# Patient Record
Sex: FEMALE | Race: WHITE | NOT HISPANIC OR LATINO | URBAN - METROPOLITAN AREA
[De-identification: names, ages, dates, MRNs, and addresses within clinical notes are randomized per-mention and may not be internally consistent; named-entity substitution may affect disease eponyms.]

---

## 2018-06-01 ENCOUNTER — OUTPATIENT (OUTPATIENT)
Dept: OUTPATIENT SERVICES | Facility: HOSPITAL | Age: 58
LOS: 1 days | Discharge: HOME | End: 2018-06-01

## 2018-06-01 VITALS
HEIGHT: 64 IN | SYSTOLIC BLOOD PRESSURE: 110 MMHG | DIASTOLIC BLOOD PRESSURE: 69 MMHG | OXYGEN SATURATION: 97 % | TEMPERATURE: 98 F | RESPIRATION RATE: 17 BRPM | HEART RATE: 69 BPM | WEIGHT: 202.83 LBS

## 2018-06-01 DIAGNOSIS — J33.9 NASAL POLYP, UNSPECIFIED: Chronic | ICD-10-CM

## 2018-06-01 DIAGNOSIS — Z01.818 ENCOUNTER FOR OTHER PREPROCEDURAL EXAMINATION: ICD-10-CM

## 2018-06-01 DIAGNOSIS — K80.20 CALCULUS OF GALLBLADDER WITHOUT CHOLECYSTITIS WITHOUT OBSTRUCTION: ICD-10-CM

## 2018-06-01 LAB
ALBUMIN SERPL ELPH-MCNC: 4 G/DL — SIGNIFICANT CHANGE UP (ref 3.5–5.2)
ALBUMIN SERPL ELPH-MCNC: 4.8 G/DL — SIGNIFICANT CHANGE UP (ref 3.5–5.2)
ALP SERPL-CCNC: 57 U/L — SIGNIFICANT CHANGE UP (ref 30–115)
ALP SERPL-CCNC: 86 U/L — SIGNIFICANT CHANGE UP (ref 30–115)
ALT FLD-CCNC: 17 U/L — SIGNIFICANT CHANGE UP (ref 0–41)
ALT FLD-CCNC: 73 U/L — HIGH (ref 0–41)
ANION GAP SERPL CALC-SCNC: 13 MMOL/L — SIGNIFICANT CHANGE UP (ref 7–14)
ANION GAP SERPL CALC-SCNC: 20 MMOL/L — HIGH (ref 7–14)
AST SERPL-CCNC: 23 U/L — SIGNIFICANT CHANGE UP (ref 0–41)
AST SERPL-CCNC: 45 U/L — HIGH (ref 0–41)
BASOPHILS # BLD AUTO: 0.04 K/UL — SIGNIFICANT CHANGE UP (ref 0–0.2)
BASOPHILS NFR BLD AUTO: 0.5 % — SIGNIFICANT CHANGE UP (ref 0–1)
BILIRUB SERPL-MCNC: 0.2 MG/DL — SIGNIFICANT CHANGE UP (ref 0.2–1.2)
BILIRUB SERPL-MCNC: 0.3 MG/DL — SIGNIFICANT CHANGE UP (ref 0.2–1.2)
BUN SERPL-MCNC: 13 MG/DL — SIGNIFICANT CHANGE UP (ref 10–20)
BUN SERPL-MCNC: 20 MG/DL — SIGNIFICANT CHANGE UP (ref 10–20)
CALCIUM SERPL-MCNC: 8.6 MG/DL — SIGNIFICANT CHANGE UP (ref 8.5–10.1)
CALCIUM SERPL-MCNC: 9.7 MG/DL — SIGNIFICANT CHANGE UP (ref 8.5–10.1)
CHLORIDE SERPL-SCNC: 103 MMOL/L — SIGNIFICANT CHANGE UP (ref 98–110)
CHLORIDE SERPL-SCNC: 99 MMOL/L — SIGNIFICANT CHANGE UP (ref 98–110)
CO2 SERPL-SCNC: 21 MMOL/L — SIGNIFICANT CHANGE UP (ref 17–32)
CO2 SERPL-SCNC: 27 MMOL/L — SIGNIFICANT CHANGE UP (ref 17–32)
CREAT SERPL-MCNC: 0.8 MG/DL — SIGNIFICANT CHANGE UP (ref 0.7–1.5)
CREAT SERPL-MCNC: 1.2 MG/DL — SIGNIFICANT CHANGE UP (ref 0.7–1.5)
EOSINOPHIL # BLD AUTO: 4.35 K/UL — HIGH (ref 0–0.7)
EOSINOPHIL NFR BLD AUTO: 49.8 % — HIGH (ref 0–8)
GLUCOSE SERPL-MCNC: 79 MG/DL — SIGNIFICANT CHANGE UP (ref 70–99)
GLUCOSE SERPL-MCNC: 90 MG/DL — SIGNIFICANT CHANGE UP (ref 70–99)
HCT VFR BLD CALC: 40.4 % — SIGNIFICANT CHANGE UP (ref 37–47)
HGB BLD-MCNC: 13.3 G/DL — SIGNIFICANT CHANGE UP (ref 12–16)
IMM GRANULOCYTES NFR BLD AUTO: 0.2 % — SIGNIFICANT CHANGE UP (ref 0.1–0.3)
LYMPHOCYTES # BLD AUTO: 2.03 K/UL — SIGNIFICANT CHANGE UP (ref 1.2–3.4)
LYMPHOCYTES # BLD AUTO: 23.3 % — SIGNIFICANT CHANGE UP (ref 20.5–51.1)
MCHC RBC-ENTMCNC: 28.7 PG — SIGNIFICANT CHANGE UP (ref 27–31)
MCHC RBC-ENTMCNC: 32.9 G/DL — SIGNIFICANT CHANGE UP (ref 32–37)
MCV RBC AUTO: 87.3 FL — SIGNIFICANT CHANGE UP (ref 81–99)
MONOCYTES # BLD AUTO: 0.43 K/UL — SIGNIFICANT CHANGE UP (ref 0.1–0.6)
MONOCYTES NFR BLD AUTO: 4.9 % — SIGNIFICANT CHANGE UP (ref 1.7–9.3)
NEUTROPHILS # BLD AUTO: 1.86 K/UL — SIGNIFICANT CHANGE UP (ref 1.4–6.5)
NEUTROPHILS NFR BLD AUTO: 21.3 % — LOW (ref 42.2–75.2)
NRBC # BLD: 0 /100 WBCS — SIGNIFICANT CHANGE UP (ref 0–0)
PLATELET # BLD AUTO: 229 K/UL — SIGNIFICANT CHANGE UP (ref 130–400)
POTASSIUM SERPL-MCNC: 4.2 MMOL/L — SIGNIFICANT CHANGE UP (ref 3.5–5)
POTASSIUM SERPL-MCNC: 4.6 MMOL/L — SIGNIFICANT CHANGE UP (ref 3.5–5)
POTASSIUM SERPL-SCNC: 4.2 MMOL/L — SIGNIFICANT CHANGE UP (ref 3.5–5)
POTASSIUM SERPL-SCNC: 4.6 MMOL/L — SIGNIFICANT CHANGE UP (ref 3.5–5)
PROT SERPL-MCNC: 6.2 G/DL — SIGNIFICANT CHANGE UP (ref 6–8)
PROT SERPL-MCNC: 7.2 G/DL — SIGNIFICANT CHANGE UP (ref 6–8)
RBC # BLD: 4.63 M/UL — SIGNIFICANT CHANGE UP (ref 4.2–5.4)
RBC # FLD: 12.9 % — SIGNIFICANT CHANGE UP (ref 11.5–14.5)
SODIUM SERPL-SCNC: 139 MMOL/L — SIGNIFICANT CHANGE UP (ref 135–146)
SODIUM SERPL-SCNC: 144 MMOL/L — SIGNIFICANT CHANGE UP (ref 135–146)
WBC # BLD: 8.73 K/UL — SIGNIFICANT CHANGE UP (ref 4.8–10.8)
WBC # FLD AUTO: 8.73 K/UL — SIGNIFICANT CHANGE UP (ref 4.8–10.8)

## 2018-06-01 NOTE — H&P PST ADULT - FAMILY HISTORY
Father  Still living? No  CVA (cerebral vascular accident), Age at diagnosis: Age Unknown     Mother  Still living? Yes, Estimated age: Age Unknown  Family history of breast cancer, Age at diagnosis: Age Unknown

## 2018-06-01 NOTE — H&P PST ADULT - NSANTHOSAYNRD_GEN_A_CORE
No. NARDA screening performed.  STOP BANG Legend: 0-2 = LOW Risk; 3-4 = INTERMEDIATE Risk; 5-8 = HIGH Risk

## 2018-06-01 NOTE — H&P PST ADULT - PMH
Asthma  LAST ATTACK 2/18, WAS HOSPITALIZED  COPD (chronic obstructive pulmonary disease)    Dyspnea  W/ EXERTION Asthma  LAST ATTACK 2/18, WAS HOSPITALIZED  COPD (chronic obstructive pulmonary disease)    Dyspnea  W/ EXERTION  NARDA (obstructive sleep apnea)  DENIES CPAP OR BIPAP (SEE PULMONARY EVAL FOR POST-OP RECOMMENDATIONS)

## 2018-06-01 NOTE — H&P PST ADULT - REASON FOR ADMISSION
57 Y/O FEMALE HERE FOR PRE-ADMISSION SURGICAL TESTING. PATIENT REPORTS HAD EPIGASTRIC PAIN. W/U REVEALED + GALLSTONES.  NOW FOR SCHEDULED PROCEDURE.

## 2018-06-06 ENCOUNTER — RESULT REVIEW (OUTPATIENT)
Age: 58
End: 2018-06-06

## 2018-06-06 ENCOUNTER — OUTPATIENT (OUTPATIENT)
Dept: OUTPATIENT SERVICES | Facility: HOSPITAL | Age: 58
LOS: 1 days | Discharge: HOME | End: 2018-06-06

## 2018-06-06 VITALS — HEART RATE: 66 BPM | SYSTOLIC BLOOD PRESSURE: 116 MMHG | DIASTOLIC BLOOD PRESSURE: 69 MMHG | RESPIRATION RATE: 18 BRPM

## 2018-06-06 VITALS
TEMPERATURE: 99 F | SYSTOLIC BLOOD PRESSURE: 144 MMHG | WEIGHT: 199.96 LBS | HEIGHT: 64 IN | HEART RATE: 76 BPM | DIASTOLIC BLOOD PRESSURE: 75 MMHG | RESPIRATION RATE: 18 BRPM

## 2018-06-06 DIAGNOSIS — J33.9 NASAL POLYP, UNSPECIFIED: Chronic | ICD-10-CM

## 2018-06-06 RX ORDER — OXYCODONE AND ACETAMINOPHEN 5; 325 MG/1; MG/1
1 TABLET ORAL EVERY 4 HOURS
Qty: 0 | Refills: 0 | Status: DISCONTINUED | OUTPATIENT
Start: 2018-06-06 | End: 2018-06-06

## 2018-06-06 RX ORDER — MEPERIDINE HYDROCHLORIDE 50 MG/ML
12.5 INJECTION INTRAMUSCULAR; INTRAVENOUS; SUBCUTANEOUS
Qty: 0 | Refills: 0 | Status: DISCONTINUED | OUTPATIENT
Start: 2018-06-06 | End: 2018-06-06

## 2018-06-06 RX ORDER — OXYCODONE AND ACETAMINOPHEN 5; 325 MG/1; MG/1
2 TABLET ORAL EVERY 6 HOURS
Qty: 0 | Refills: 0 | Status: DISCONTINUED | OUTPATIENT
Start: 2018-06-06 | End: 2018-06-06

## 2018-06-06 RX ORDER — FLUTICASONE FUROATE AND VILANTEROL TRIFENATATE 100; 25 UG/1; UG/1
1 POWDER RESPIRATORY (INHALATION)
Qty: 0 | Refills: 0 | COMMUNITY

## 2018-06-06 RX ORDER — MORPHINE SULFATE 50 MG/1
2 CAPSULE, EXTENDED RELEASE ORAL
Qty: 0 | Refills: 0 | Status: DISCONTINUED | OUTPATIENT
Start: 2018-06-06 | End: 2018-06-06

## 2018-06-06 RX ORDER — MORPHINE SULFATE 50 MG/1
4 CAPSULE, EXTENDED RELEASE ORAL
Qty: 0 | Refills: 0 | Status: DISCONTINUED | OUTPATIENT
Start: 2018-06-06 | End: 2018-06-06

## 2018-06-06 RX ORDER — MONTELUKAST 4 MG/1
1 TABLET, CHEWABLE ORAL
Qty: 0 | Refills: 0 | COMMUNITY

## 2018-06-06 RX ORDER — ONDANSETRON 8 MG/1
4 TABLET, FILM COATED ORAL ONCE
Qty: 0 | Refills: 0 | Status: DISCONTINUED | OUTPATIENT
Start: 2018-06-06 | End: 2018-06-06

## 2018-06-06 RX ORDER — SODIUM CHLORIDE 9 MG/ML
1000 INJECTION, SOLUTION INTRAVENOUS
Qty: 0 | Refills: 0 | Status: DISCONTINUED | OUTPATIENT
Start: 2018-06-06 | End: 2018-06-06

## 2018-06-06 RX ADMIN — SODIUM CHLORIDE 100 MILLILITER(S): 9 INJECTION, SOLUTION INTRAVENOUS at 13:47

## 2018-06-06 RX ADMIN — OXYCODONE AND ACETAMINOPHEN 1 TABLET(S): 5; 325 TABLET ORAL at 14:26

## 2018-06-06 RX ADMIN — OXYCODONE AND ACETAMINOPHEN 1 TABLET(S): 5; 325 TABLET ORAL at 14:25

## 2018-06-06 NOTE — BRIEF OPERATIVE NOTE - PROCEDURE
<<-----Click on this checkbox to enter Procedure Laparoscopic cholecystectomy  06/06/2018    Active  HEVER

## 2018-06-06 NOTE — PRE-ANESTHESIA EVALUATION ADULT - NSANTHADDINFOFT_GEN_ALL_CORE
General. discussed with the patient all the risks, benefits, alternatives, complications. all questions answered. willing to proceed

## 2018-06-06 NOTE — ASU PATIENT PROFILE, ADULT - PMH
Asthma  LAST ATTACK 2/18, WAS HOSPITALIZED  COPD (chronic obstructive pulmonary disease)    Dyspnea  W/ EXERTION  NARDA (obstructive sleep apnea)  DENIES CPAP OR BIPAP (SEE PULMONARY EVAL FOR POST-OP RECOMMENDATIONS)

## 2018-06-06 NOTE — CHART NOTE - NSCHARTNOTEFT_GEN_A_CORE
PACU ANESTHESIA ADMISSION NOTE      Procedure: Laparoscopic cholecystectomy    Post op diagnosis:  Chronic cholecystitis      ____  Intubated  TV:______       Rate: ______      FiO2: ______    _x___  Patent Airway    _x___  Full return of protective reflexes    _x___  Full recovery from anesthesia / back to baseline status    Vitals:            T:      97.7          BP :    134/74            R:  14            Sat:   98%            P:74      Mental Status:  _x___ Awake   _____ Alert   _____ Drowsy   _____ Sedated    Nausea/Vomiting:  _x___  NO       ______Yes,   See Post - Op Orders         Pain Scale (0-10):  __0___    Treatment: _x___ None    ____ See Post - Op/PCA Orders    Post - Operative Fluids:   __x__ Oral   ____ See Post - Op Orders    Plan: Discharge:   _x___Home       _____Floor     _____Critical Care    _____  Other:_________________    Comments:  No anesthesia issues or complications noted.  Discharge when criteria met.

## 2018-06-08 LAB — SURGICAL PATHOLOGY STUDY: SIGNIFICANT CHANGE UP

## 2018-06-11 DIAGNOSIS — Z88.8 ALLERGY STATUS TO OTHER DRUGS, MEDICAMENTS AND BIOLOGICAL SUBSTANCES STATUS: ICD-10-CM

## 2018-06-11 DIAGNOSIS — K80.10 CALCULUS OF GALLBLADDER WITH CHRONIC CHOLECYSTITIS WITHOUT OBSTRUCTION: ICD-10-CM

## 2018-06-11 DIAGNOSIS — G47.33 OBSTRUCTIVE SLEEP APNEA (ADULT) (PEDIATRIC): ICD-10-CM

## 2018-06-11 DIAGNOSIS — J44.9 CHRONIC OBSTRUCTIVE PULMONARY DISEASE, UNSPECIFIED: ICD-10-CM

## 2018-12-25 ENCOUNTER — INPATIENT (INPATIENT)
Facility: HOSPITAL | Age: 58
LOS: 4 days | Discharge: HOME | End: 2018-12-30
Attending: INTERNAL MEDICINE | Admitting: INTERNAL MEDICINE

## 2018-12-25 VITALS — WEIGHT: 199.96 LBS

## 2018-12-25 DIAGNOSIS — J33.9 NASAL POLYP, UNSPECIFIED: Chronic | ICD-10-CM

## 2018-12-25 LAB
ALBUMIN SERPL ELPH-MCNC: 4.2 G/DL — SIGNIFICANT CHANGE UP (ref 3.5–5.2)
ALP SERPL-CCNC: 188 U/L — HIGH (ref 30–115)
ALT FLD-CCNC: 58 U/L — HIGH (ref 0–41)
ANION GAP SERPL CALC-SCNC: 15 MMOL/L — HIGH (ref 7–14)
APPEARANCE UR: CLEAR — SIGNIFICANT CHANGE UP
AST SERPL-CCNC: 122 U/L — HIGH (ref 0–41)
BACTERIA # UR AUTO: ABNORMAL
BASOPHILS # BLD AUTO: 0.11 K/UL — SIGNIFICANT CHANGE UP (ref 0–0.2)
BASOPHILS NFR BLD AUTO: 0.6 % — SIGNIFICANT CHANGE UP (ref 0–1)
BILIRUB SERPL-MCNC: 0.3 MG/DL — SIGNIFICANT CHANGE UP (ref 0.2–1.2)
BILIRUB UR-MCNC: NEGATIVE — SIGNIFICANT CHANGE UP
BUN SERPL-MCNC: 19 MG/DL — SIGNIFICANT CHANGE UP (ref 10–20)
CALCIUM SERPL-MCNC: 8.8 MG/DL — SIGNIFICANT CHANGE UP (ref 8.5–10.1)
CHLORIDE SERPL-SCNC: 100 MMOL/L — SIGNIFICANT CHANGE UP (ref 98–110)
CK MB CFR SERPL CALC: 5.6 NG/ML — SIGNIFICANT CHANGE UP (ref 0.6–6.3)
CK SERPL-CCNC: 170 U/L — SIGNIFICANT CHANGE UP (ref 0–225)
CO2 SERPL-SCNC: 25 MMOL/L — SIGNIFICANT CHANGE UP (ref 17–32)
COLOR SPEC: YELLOW — SIGNIFICANT CHANGE UP
COMMENT - URINE: SIGNIFICANT CHANGE UP
CREAT SERPL-MCNC: 1.1 MG/DL — SIGNIFICANT CHANGE UP (ref 0.7–1.5)
DIFF PNL FLD: ABNORMAL
EOSINOPHIL # BLD AUTO: 0.36 K/UL — SIGNIFICANT CHANGE UP (ref 0–0.7)
EOSINOPHIL NFR BLD AUTO: 2.1 % — SIGNIFICANT CHANGE UP (ref 0–8)
EPI CELLS # UR: ABNORMAL /HPF
ETHANOL SERPL-MCNC: <10 MG/DL — HIGH
GAS PNL BLDA: SIGNIFICANT CHANGE UP
GLUCOSE SERPL-MCNC: 272 MG/DL — HIGH (ref 70–99)
GLUCOSE UR QL: >=1000 MG/DL
HCT VFR BLD CALC: 43.4 % — SIGNIFICANT CHANGE UP (ref 37–47)
HGB BLD-MCNC: 13.7 G/DL — SIGNIFICANT CHANGE UP (ref 12–16)
IMM GRANULOCYTES NFR BLD AUTO: 2.3 % — HIGH (ref 0.1–0.3)
KETONES UR-MCNC: NEGATIVE — SIGNIFICANT CHANGE UP
LEUKOCYTE ESTERASE UR-ACNC: NEGATIVE — SIGNIFICANT CHANGE UP
LIDOCAIN IGE QN: 33 U/L — SIGNIFICANT CHANGE UP (ref 7–60)
LYMPHOCYTES # BLD AUTO: 47 % — SIGNIFICANT CHANGE UP (ref 20.5–51.1)
LYMPHOCYTES # BLD AUTO: 7.97 K/UL — HIGH (ref 1.2–3.4)
MCHC RBC-ENTMCNC: 28.4 PG — SIGNIFICANT CHANGE UP (ref 27–31)
MCHC RBC-ENTMCNC: 31.6 G/DL — LOW (ref 32–37)
MCV RBC AUTO: 90 FL — SIGNIFICANT CHANGE UP (ref 81–99)
MONOCYTES # BLD AUTO: 0.87 K/UL — HIGH (ref 0.1–0.6)
MONOCYTES NFR BLD AUTO: 5.1 % — SIGNIFICANT CHANGE UP (ref 1.7–9.3)
NEUTROPHILS # BLD AUTO: 7.25 K/UL — HIGH (ref 1.4–6.5)
NEUTROPHILS NFR BLD AUTO: 42.9 % — SIGNIFICANT CHANGE UP (ref 42.2–75.2)
NITRITE UR-MCNC: NEGATIVE — SIGNIFICANT CHANGE UP
NRBC # BLD: 0 /100 WBCS — SIGNIFICANT CHANGE UP (ref 0–0)
PH UR: 5 — SIGNIFICANT CHANGE UP (ref 5–8)
PLATELET # BLD AUTO: 327 K/UL — SIGNIFICANT CHANGE UP (ref 130–400)
POTASSIUM SERPL-MCNC: 4.6 MMOL/L — SIGNIFICANT CHANGE UP (ref 3.5–5)
POTASSIUM SERPL-SCNC: 4.6 MMOL/L — SIGNIFICANT CHANGE UP (ref 3.5–5)
PROT SERPL-MCNC: 6.7 G/DL — SIGNIFICANT CHANGE UP (ref 6–8)
PROT UR-MCNC: 100 MG/DL
RBC # BLD: 4.82 M/UL — SIGNIFICANT CHANGE UP (ref 4.2–5.4)
RBC # FLD: 13.2 % — SIGNIFICANT CHANGE UP (ref 11.5–14.5)
RBC CASTS # UR COMP ASSIST: SIGNIFICANT CHANGE UP /HPF
SODIUM SERPL-SCNC: 140 MMOL/L — SIGNIFICANT CHANGE UP (ref 135–146)
SP GR SPEC: >=1.03 (ref 1.01–1.03)
TROPONIN T SERPL-MCNC: 0.15 NG/ML — CRITICAL HIGH
TROPONIN T SERPL-MCNC: <0.01 NG/ML — SIGNIFICANT CHANGE UP
UROBILINOGEN FLD QL: 0.2 MG/DL — SIGNIFICANT CHANGE UP (ref 0.2–0.2)
WBC # BLD: 16.95 K/UL — HIGH (ref 4.8–10.8)
WBC # FLD AUTO: 16.95 K/UL — HIGH (ref 4.8–10.8)
WBC UR QL: ABNORMAL /HPF

## 2018-12-25 RX ORDER — DIPHENHYDRAMINE HCL 50 MG
50 CAPSULE ORAL ONCE
Qty: 0 | Refills: 0 | Status: COMPLETED | OUTPATIENT
Start: 2018-12-25 | End: 2018-12-25

## 2018-12-25 RX ORDER — ENOXAPARIN SODIUM 100 MG/ML
40 INJECTION SUBCUTANEOUS DAILY
Qty: 0 | Refills: 0 | Status: DISCONTINUED | OUTPATIENT
Start: 2018-12-25 | End: 2018-12-30

## 2018-12-25 RX ORDER — IPRATROPIUM/ALBUTEROL SULFATE 18-103MCG
1 AEROSOL WITH ADAPTER (GRAM) INHALATION
Qty: 0 | Refills: 0 | Status: DISCONTINUED | OUTPATIENT
Start: 2018-12-25 | End: 2018-12-26

## 2018-12-25 RX ORDER — MIDAZOLAM HYDROCHLORIDE 1 MG/ML
0.02 INJECTION, SOLUTION INTRAMUSCULAR; INTRAVENOUS
Qty: 100 | Refills: 0 | Status: DISCONTINUED | OUTPATIENT
Start: 2018-12-25 | End: 2018-12-28

## 2018-12-25 RX ORDER — MAGNESIUM SULFATE 500 MG/ML
2 VIAL (ML) INJECTION ONCE
Qty: 0 | Refills: 0 | Status: COMPLETED | OUTPATIENT
Start: 2018-12-25 | End: 2018-12-25

## 2018-12-25 RX ORDER — SUCCINYLCHOLINE CHLORIDE 100 MG/5ML
100 SYRINGE (ML) INTRAVENOUS ONCE
Qty: 0 | Refills: 0 | Status: COMPLETED | OUTPATIENT
Start: 2018-12-25 | End: 2018-12-25

## 2018-12-25 RX ORDER — FENTANYL CITRATE 50 UG/ML
0.5 INJECTION INTRAVENOUS
Qty: 5000 | Refills: 0 | Status: DISCONTINUED | OUTPATIENT
Start: 2018-12-25 | End: 2018-12-25

## 2018-12-25 RX ORDER — FAMOTIDINE 10 MG/ML
20 INJECTION INTRAVENOUS
Qty: 0 | Refills: 0 | Status: DISCONTINUED | OUTPATIENT
Start: 2018-12-25 | End: 2018-12-25

## 2018-12-25 RX ORDER — FENTANYL CITRATE 50 UG/ML
0.5 INJECTION INTRAVENOUS
Qty: 2500 | Refills: 0 | Status: DISCONTINUED | OUTPATIENT
Start: 2018-12-25 | End: 2018-12-28

## 2018-12-25 RX ORDER — FAMOTIDINE 10 MG/ML
20 INJECTION INTRAVENOUS
Qty: 0 | Refills: 0 | Status: DISCONTINUED | OUTPATIENT
Start: 2018-12-25 | End: 2018-12-27

## 2018-12-25 RX ORDER — KETAMINE HYDROCHLORIDE 100 MG/ML
1 INJECTION INTRAMUSCULAR; INTRAVENOUS
Qty: 200 | Refills: 0 | Status: DISCONTINUED | OUTPATIENT
Start: 2018-12-25 | End: 2018-12-25

## 2018-12-25 RX ORDER — KETAMINE HYDROCHLORIDE 100 MG/ML
50 INJECTION INTRAMUSCULAR; INTRAVENOUS ONCE
Qty: 0 | Refills: 0 | Status: DISCONTINUED | OUTPATIENT
Start: 2018-12-25 | End: 2018-12-25

## 2018-12-25 RX ORDER — FENTANYL CITRATE 50 UG/ML
50 INJECTION INTRAVENOUS ONCE
Qty: 0 | Refills: 0 | Status: DISCONTINUED | OUTPATIENT
Start: 2018-12-25 | End: 2018-12-28

## 2018-12-25 RX ADMIN — Medication 50 GRAM(S): at 16:42

## 2018-12-25 RX ADMIN — Medication 125 MILLIGRAM(S): at 16:30

## 2018-12-25 RX ADMIN — Medication 50 MILLIGRAM(S): at 17:00

## 2018-12-25 RX ADMIN — Medication 100 MILLIGRAM(S): at 16:51

## 2018-12-25 RX ADMIN — KETAMINE HYDROCHLORIDE 50 MILLIGRAM(S): 100 INJECTION INTRAMUSCULAR; INTRAVENOUS at 16:46

## 2018-12-25 RX ADMIN — FENTANYL CITRATE 4.54 MICROGRAM(S)/KG/HR: 50 INJECTION INTRAVENOUS at 17:25

## 2018-12-25 RX ADMIN — MIDAZOLAM HYDROCHLORIDE 1.81 MG/KG/HR: 1 INJECTION, SOLUTION INTRAMUSCULAR; INTRAVENOUS at 17:31

## 2018-12-25 NOTE — CONSULT NOTE ADULT - SUBJECTIVE AND OBJECTIVE BOX
Patient is a 58y old  Female who presents with a chief complaint of anaphylactic shock    HPI: 58 F brought in by EMS for anaphylactic shock. She took advil and then she developed facial redness and sudden onset SOB . Family then called the EMS and she was intubated in the ambulance. She took advil 2 months ago and she usually develop mild hives with advil. This is the first time she developed anaphylactic shock.    She was found to be in asysytole in ER, did CPR and 2 rounds of epinephrine given. Reintubated in ER.    She was fully functional without any complaints recently until this episode.        PAST MEDICAL & SURGICAL HISTORY:  NARDA (obstructive sleep apnea): DENIES CPAP OR BIPAP (SEE PULMONARY EVAL FOR POST-OP RECOMMENDATIONS)  Dyspnea: W/ EXERTION  COPD (chronic obstructive pulmonary disease)  Asthma: LAST ATTACK 2/18, WAS HOSPITALIZED  Nasal polyps: REMOVAL OF NASAL POLYPS 2015    Allergies    ibuprofen (Rash)  predniSONE (Rash)    Intolerances        Family history : no cardiovscular family history     Home Medications:  Breo Ellipta 100 mcg-25 mcg/inh inhalation powder: 1 puff(s) inhaled once a day (at bedtime) (06 Jun 2018 10:41)  Singulair 10 mg oral tablet: 1 tab(s) orally once a day (at bedtime) (06 Jun 2018 10:41)    Occupation:  Alochol: Occasionally  Smoking: Former  Drug Use: Denied  Marital Status:         ROS: as in HPI; All other systems reviewed are negative    ICU Vital Signs Last 24 Hrs  T(C): 36.1 (25 Dec 2018 19:00), Max: 36.6 (25 Dec 2018 17:44)  T(F): 97 (25 Dec 2018 19:00), Max: 97.8 (25 Dec 2018 17:44)  HR: 100 (25 Dec 2018 19:00) (84 - 118)  BP: 115/65 (25 Dec 2018 19:00) (99/60 - 199/122)  BP(mean): --  ABP: --  ABP(mean): --  RR: 24 (25 Dec 2018 21:00) (12 - 26)  SpO2: 100% (25 Dec 2018 21:00) (99% - 100%)        Physical Examination:    General: Intubated and sedated    HEENT: Pupils equal, reactive to light.  Symmetric.    PULM: Clear to auscultation bilaterally, no significant sputum production    CVS: Regular rate and rhythm, no murmurs, rubs, or gallops    ABD: Soft, nondistended, nontender, normoactive bowel sounds, no masses    EXT: No edema, nontender, no clubbing     SKIN: Warm and well perfused, no rashes noted.    Neurology : Intubated and sedated      Mode: Auto Mode: PRVC/ Volume Support  RR (machine): 24  TV (machine): 400  FiO2: 60  PEEP: 5  MAP: 11  PIP: 20      ABG - ( 25 Dec 2018 17:14 )  pH, Arterial: 7.16  pH, Blood: x     /  pCO2: 72    /  pO2: 333   / HCO3: 26    / Base Excess: -5.1  /  SaO2: 100                 I&O's Detail    25 Dec 2018 07:01  -  25 Dec 2018 23:06  --------------------------------------------------------  IN:  Total IN: 0 mL    OUT:    Indwelling Catheter - Urethral: 335 mL  Total OUT: 335 mL    Total NET: -335 mL            LABS:                        13.7   16.95 )-----------( 327      ( 25 Dec 2018 17:00 )             43.4     25 Dec 2018 17:00    140    |  100    |  19     ----------------------------<  272    4.6     |  25     |  1.1      Ca    8.8        25 Dec 2018 17:00    TPro  6.7    /  Alb  4.2    /  TBili  0.3    /  DBili  x      /  AST  122    /  ALT  58     /  AlkPhos  188    25 Dec 2018 17:00  Amylase x     lipase 33         CARDIAC MARKERS ( 25 Dec 2018 22:00 )  x     / 0.15 ng/mL / x     / x     / 5.6 ng/mL  CARDIAC MARKERS ( 25 Dec 2018 17:00 )  x     / <0.01 ng/mL / 170 U/L / x     / x          CAPILLARY BLOOD GLUCOSE      POCT Blood Glucose.: 239 mg/dL (25 Dec 2018 16:18)      Urinalysis Basic - ( 25 Dec 2018 18:41 )    Color: Yellow / Appearance: Clear / SG: >=1.030 / pH: x  Gluc: x / Ketone: Negative  / Bili: Negative / Urobili: 0.2 mg/dL   Blood: x / Protein: 100 mg/dL / Nitrite: Negative   Leuk Esterase: Negative / RBC: 1-2 /HPF / WBC 6-10 /HPF   Sq Epi: x / Non Sq Epi: Moderate /HPF / Bacteria: Few      Culture        MEDICATIONS  (STANDING):  ALBUTerol/ipratropium (CFC free) Inhaler. 1 Puff(s) Inhalation four times a day  enoxaparin Injectable 40 milliGRAM(s) SubCutaneous daily  famotidine Injectable 20 milliGRAM(s) IV Push two times a day  fentaNYL    Injectable 50 MICROGram(s) IV Push once  fentaNYL   Infusion 0.5 MICROgram(s)/kG/Hr (4.535 mL/Hr) IV Continuous <Continuous>  methylPREDNISolone sodium succinate Injectable 60 milliGRAM(s) IV Push two times a day  midazolam Infusion 0.02 mG/kG/Hr (1.814 mL/Hr) IV Continuous <Continuous>    MEDICATIONS  (PRN):        RADIOLOGY: ***     CXR:  TLC:  OG:  ET tube:          ECHO:

## 2018-12-25 NOTE — CONSULT NOTE ADULT - ASSESSMENT
IMPRESSION: Anaphylactic shock secondary to Ibuprofen        PLAN:    CNS: Continue sedation. On fentanyl and versed.    HEENT: HOB>45 degrees. oral care.     PULMONARY: Nebs q4h. Solumedrol. Weaning trial as needed.     CARDIOVASCULAR: Keep I=O.    GI: GI prophylaxis.  Feeding     RENAL: F/u lytes and replete PRN    INFECTIOUS DISEASE: Pan cx    HEMATOLOGICAL:  DVT prophylaxis.    ENDOCRINE:  Follow up FS.  Insulin protocol if needed.    MUSCULOSKELETAL:        CRITICAL CARE TIME SPENT: ***

## 2018-12-25 NOTE — ED ADULT NURSE REASSESSMENT NOTE - NS ED NURSE REASSESS COMMENT FT1
miscellaneous drip is Versed. Pt taken to Ct scan , tolerated well, then to CCU. Family in waiting area. Jimenez catheter inserted and urine sent.

## 2018-12-25 NOTE — ED PROVIDER NOTE - MEDICAL DECISION MAKING DETAILS
58 y.o. female, PMH of asthma/COPD, h/o hospitalizations and intubations, BIBA after she had respiratory arrest. As per family, pt took Aleve 2 tabs, soon after got red, developed SOB and nausea. Family gave neb and called 911. When paramedics arrived, pt was struggling to breath and needed to be intubated. On arrival to ED, pt was pulseless. CPR initiated, low sats, tube placement could not be confirmed, tube taken out. Pt was bagged. After 2 rounds of CPR, pt regained pulses. Intubated. On exam, pt initially unresponsive, head NC/AT, lungs diffuse wheezing B/L, CV S1S2 regular, abdomen soft/distended, ext (-) edema. Meds for asthma vs anaphylaxis started. Labs sent. Sedation started- pt started to wake up. Will admit.

## 2018-12-25 NOTE — ED ADULT TRIAGE NOTE - CHIEF COMPLAINT QUOTE
biba from home.  took alieve and became red .  intubated in the field  ett7.5   .3 epi, albuterol, etomidate 20mg and diazepam 5mg given in the field

## 2018-12-25 NOTE — ED ADULT NURSE NOTE - NSIMPLEMENTINTERV_GEN_ALL_ED
Implemented All Fall Risk Interventions:  [place] to call system. Call bell, personal items and telephone within reach. Instruct patient to call for assistance. Room bathroom lighting operational. Non-slip footwear when patient is off stretcher. Physically safe environment: no spills, clutter or unnecessary equipment. Stretcher in lowest position, wheels locked, appropriate side rails in place. Provide visual cue, wrist band, yellow gown, etc. Monitor gait and stability. Monitor for mental status changes and reorient to person, place, and time. Review medications for side effects contributing to fall risk. Reinforce activity limits and safety measures with patient and family.

## 2018-12-25 NOTE — ED ADULT NURSE REASSESSMENT NOTE - NS ED NURSE REASSESS COMMENT FT1
1618- pt developed spontanous pulse and cpr discontinued, 1630- pt intubated by ED MD with ET-7.5 LL- 22and pt given solumedtol 125 mg IVP. 1640- ET tube removed by MD and LMA inserted and pt given O2 100% via ambu bag. 1653- Pt intubated by anestesia- ET- 7.5, LL- 22. Placemnet confirmed by color change, bilateral breath sounds and chest xray. Vent settings- vol 400, RR-20, FIO2-100% and peep-5

## 2018-12-25 NOTE — PATIENT PROFILE ADULT - VISION (WITH CORRECTIVE LENSES IF THE PATIENT USUALLY WEARS THEM):
pt wears glasses for distance/Partially impaired: cannot see medication labels or newsprint, but can see obstacles in path, and the surrounding layout; can count fingers at arm's length

## 2018-12-25 NOTE — ED ADULT NURSE NOTE - CHIEF COMPLAINT QUOTE
biba from home.  took alieve and became red and sob.  intubated in the field  ett7.5   .3 epi, albuterol, etomidate 20mg and diazepam 5mg given in the field.  hx of prior intubation

## 2018-12-25 NOTE — ED PROVIDER NOTE - PHYSICAL EXAMINATION
Vital Signs: I have reviewed the initial vital signs.  Constitutional: intubated, sedated  HEENT: Airway patent, moist MM. EOMI, PERRLA.  CV: tachycardic rate, regular rhythm  Lungs: rhonchorous BL breath sounds, et tube 22 at lip. 7.5 tube  ABD: distended, soft  MSK: no visible deformities  INTEG: Skin diaphoretic  NEURO: unresponsive  PSYCH: unresponsive

## 2018-12-25 NOTE — ED PROVIDER NOTE - OBJECTIVE STATEMENT
58yF with PMH COPD/asthma p/w cardiac arrest. EMS was called to scene for respiratory distress, intubated patient. In ED patient was cyanotic and pulseless. ET tube was removed, patient underwent 2 rounds of CPR (asystole) with ROSC after 2 rounds epi, 1 round of bicarb. started on levophed drip post-resuscitation, LMA placed, and intubated by anesthesia. femoral line placed. solu-medrol, benadryl, MDI given.

## 2018-12-26 DIAGNOSIS — Z90.49 ACQUIRED ABSENCE OF OTHER SPECIFIED PARTS OF DIGESTIVE TRACT: Chronic | ICD-10-CM

## 2018-12-26 PROBLEM — G47.33 OBSTRUCTIVE SLEEP APNEA (ADULT) (PEDIATRIC): Chronic | Status: ACTIVE | Noted: 2018-06-05

## 2018-12-26 PROBLEM — J44.9 CHRONIC OBSTRUCTIVE PULMONARY DISEASE, UNSPECIFIED: Chronic | Status: ACTIVE | Noted: 2018-06-01

## 2018-12-26 PROBLEM — R06.00 DYSPNEA, UNSPECIFIED: Chronic | Status: ACTIVE | Noted: 2018-06-01

## 2018-12-26 PROBLEM — J45.909 UNSPECIFIED ASTHMA, UNCOMPLICATED: Chronic | Status: ACTIVE | Noted: 2018-06-01

## 2018-12-26 LAB
ALBUMIN SERPL ELPH-MCNC: 4 G/DL — SIGNIFICANT CHANGE UP (ref 3.5–5.2)
ALP SERPL-CCNC: 159 U/L — HIGH (ref 30–115)
ALT FLD-CCNC: 123 U/L — HIGH (ref 0–41)
ANION GAP SERPL CALC-SCNC: 11 MMOL/L — SIGNIFICANT CHANGE UP (ref 7–14)
AST SERPL-CCNC: 87 U/L — HIGH (ref 0–41)
BASE EXCESS BLDA CALC-SCNC: 1.2 MMOL/L — SIGNIFICANT CHANGE UP (ref -2–2)
BILIRUB SERPL-MCNC: 0.3 MG/DL — SIGNIFICANT CHANGE UP (ref 0.2–1.2)
BUN SERPL-MCNC: 17 MG/DL — SIGNIFICANT CHANGE UP (ref 10–20)
CALCIUM SERPL-MCNC: 8.6 MG/DL — SIGNIFICANT CHANGE UP (ref 8.5–10.1)
CHLORIDE SERPL-SCNC: 103 MMOL/L — SIGNIFICANT CHANGE UP (ref 98–110)
CO2 SERPL-SCNC: 27 MMOL/L — SIGNIFICANT CHANGE UP (ref 17–32)
CREAT SERPL-MCNC: 0.9 MG/DL — SIGNIFICANT CHANGE UP (ref 0.7–1.5)
GLUCOSE BLDC GLUCOMTR-MCNC: 131 MG/DL — HIGH (ref 70–99)
GLUCOSE SERPL-MCNC: 164 MG/DL — HIGH (ref 70–99)
HCO3 BLDA-SCNC: 28 MMOL/L — HIGH (ref 23–27)
HCT VFR BLD CALC: 39.9 % — SIGNIFICANT CHANGE UP (ref 37–47)
HGB BLD-MCNC: 13 G/DL — SIGNIFICANT CHANGE UP (ref 12–16)
HOROWITZ INDEX BLDA+IHG-RTO: 40 — SIGNIFICANT CHANGE UP
MAGNESIUM SERPL-MCNC: 2.7 MG/DL — HIGH (ref 1.8–2.4)
MCHC RBC-ENTMCNC: 28.7 PG — SIGNIFICANT CHANGE UP (ref 27–31)
MCHC RBC-ENTMCNC: 32.6 G/DL — SIGNIFICANT CHANGE UP (ref 32–37)
MCV RBC AUTO: 88.1 FL — SIGNIFICANT CHANGE UP (ref 81–99)
NRBC # BLD: 0 /100 WBCS — SIGNIFICANT CHANGE UP (ref 0–0)
PCO2 BLDA: 49 MMHG — HIGH (ref 38–42)
PH BLDA: 7.36 — LOW (ref 7.38–7.42)
PLATELET # BLD AUTO: 243 K/UL — SIGNIFICANT CHANGE UP (ref 130–400)
PO2 BLDA: 103 MMHG — HIGH (ref 78–95)
POTASSIUM SERPL-MCNC: 4.9 MMOL/L — SIGNIFICANT CHANGE UP (ref 3.5–5)
POTASSIUM SERPL-SCNC: 4.9 MMOL/L — SIGNIFICANT CHANGE UP (ref 3.5–5)
PROT SERPL-MCNC: 6.3 G/DL — SIGNIFICANT CHANGE UP (ref 6–8)
RBC # BLD: 4.53 M/UL — SIGNIFICANT CHANGE UP (ref 4.2–5.4)
RBC # FLD: 13.5 % — SIGNIFICANT CHANGE UP (ref 11.5–14.5)
SAO2 % BLDA: 97 % — SIGNIFICANT CHANGE UP (ref 94–98)
SODIUM SERPL-SCNC: 141 MMOL/L — SIGNIFICANT CHANGE UP (ref 135–146)
TROPONIN T SERPL-MCNC: 0.13 NG/ML — CRITICAL HIGH
WBC # BLD: 13.83 K/UL — HIGH (ref 4.8–10.8)
WBC # FLD AUTO: 13.83 K/UL — HIGH (ref 4.8–10.8)

## 2018-12-26 RX ORDER — MONTELUKAST 4 MG/1
10 TABLET, CHEWABLE ORAL AT BEDTIME
Qty: 0 | Refills: 0 | Status: DISCONTINUED | OUTPATIENT
Start: 2018-12-26 | End: 2018-12-30

## 2018-12-26 RX ORDER — DEXMEDETOMIDINE HYDROCHLORIDE IN 0.9% SODIUM CHLORIDE 4 UG/ML
0.2 INJECTION INTRAVENOUS
Qty: 200 | Refills: 0 | Status: DISCONTINUED | OUTPATIENT
Start: 2018-12-26 | End: 2018-12-28

## 2018-12-26 RX ORDER — IPRATROPIUM/ALBUTEROL SULFATE 18-103MCG
3 AEROSOL WITH ADAPTER (GRAM) INHALATION EVERY 4 HOURS
Qty: 0 | Refills: 0 | Status: DISCONTINUED | OUTPATIENT
Start: 2018-12-26 | End: 2018-12-30

## 2018-12-26 RX ORDER — AMPICILLIN SODIUM AND SULBACTAM SODIUM 250; 125 MG/ML; MG/ML
3 INJECTION, POWDER, FOR SUSPENSION INTRAMUSCULAR; INTRAVENOUS ONCE
Qty: 0 | Refills: 0 | Status: COMPLETED | OUTPATIENT
Start: 2018-12-26 | End: 2018-12-26

## 2018-12-26 RX ORDER — CHLORHEXIDINE GLUCONATE 213 G/1000ML
15 SOLUTION TOPICAL
Qty: 0 | Refills: 0 | Status: DISCONTINUED | OUTPATIENT
Start: 2018-12-26 | End: 2018-12-30

## 2018-12-26 RX ORDER — FLUTICASONE FUROATE AND VILANTEROL TRIFENATATE 100; 25 UG/1; UG/1
0 POWDER RESPIRATORY (INHALATION)
Qty: 0 | Refills: 0 | COMMUNITY

## 2018-12-26 RX ORDER — AMPICILLIN SODIUM AND SULBACTAM SODIUM 250; 125 MG/ML; MG/ML
INJECTION, POWDER, FOR SUSPENSION INTRAMUSCULAR; INTRAVENOUS
Qty: 0 | Refills: 0 | Status: DISCONTINUED | OUTPATIENT
Start: 2018-12-26 | End: 2018-12-30

## 2018-12-26 RX ORDER — SODIUM CHLORIDE 9 MG/ML
500 INJECTION, SOLUTION INTRAVENOUS ONCE
Qty: 0 | Refills: 0 | Status: COMPLETED | OUTPATIENT
Start: 2018-12-26 | End: 2018-12-26

## 2018-12-26 RX ORDER — AMPICILLIN SODIUM AND SULBACTAM SODIUM 250; 125 MG/ML; MG/ML
3 INJECTION, POWDER, FOR SUSPENSION INTRAMUSCULAR; INTRAVENOUS EVERY 6 HOURS
Qty: 0 | Refills: 0 | Status: DISCONTINUED | OUTPATIENT
Start: 2018-12-27 | End: 2018-12-30

## 2018-12-26 RX ADMIN — FENTANYL CITRATE 4.54 MICROGRAM(S)/KG/HR: 50 INJECTION INTRAVENOUS at 08:13

## 2018-12-26 RX ADMIN — Medication 1 DROP(S): at 14:50

## 2018-12-26 RX ADMIN — MONTELUKAST 10 MILLIGRAM(S): 4 TABLET, CHEWABLE ORAL at 21:20

## 2018-12-26 RX ADMIN — Medication 60 MILLIGRAM(S): at 14:26

## 2018-12-26 RX ADMIN — Medication 60 MILLIGRAM(S): at 05:24

## 2018-12-26 RX ADMIN — CHLORHEXIDINE GLUCONATE 15 MILLILITER(S): 213 SOLUTION TOPICAL at 17:19

## 2018-12-26 RX ADMIN — DEXMEDETOMIDINE HYDROCHLORIDE IN 0.9% SODIUM CHLORIDE 4.71 MICROGRAM(S)/KG/HR: 4 INJECTION INTRAVENOUS at 21:21

## 2018-12-26 RX ADMIN — Medication 1 DROP(S): at 17:14

## 2018-12-26 RX ADMIN — ENOXAPARIN SODIUM 40 MILLIGRAM(S): 100 INJECTION SUBCUTANEOUS at 13:15

## 2018-12-26 RX ADMIN — MIDAZOLAM HYDROCHLORIDE 1.81 MG/KG/HR: 1 INJECTION, SOLUTION INTRAMUSCULAR; INTRAVENOUS at 08:14

## 2018-12-26 RX ADMIN — AMPICILLIN SODIUM AND SULBACTAM SODIUM 200 GRAM(S): 250; 125 INJECTION, POWDER, FOR SUSPENSION INTRAMUSCULAR; INTRAVENOUS at 17:19

## 2018-12-26 RX ADMIN — Medication 60 MILLIGRAM(S): at 21:20

## 2018-12-26 RX ADMIN — SODIUM CHLORIDE 1500 MILLILITER(S): 9 INJECTION, SOLUTION INTRAVENOUS at 10:22

## 2018-12-26 RX ADMIN — FAMOTIDINE 20 MILLIGRAM(S): 10 INJECTION INTRAVENOUS at 17:13

## 2018-12-26 RX ADMIN — FAMOTIDINE 20 MILLIGRAM(S): 10 INJECTION INTRAVENOUS at 06:35

## 2018-12-26 NOTE — PHARMACOTHERAPY INTERVENTION NOTE - COMMENTS
Dr Juarez aware of allergy to prednisone. Ok to give solumedrol since patient tolerates iv solumedrol as per MD

## 2018-12-26 NOTE — CONSULT NOTE ADULT - ASSESSMENT
IMPRESSION:   Cardiac arrest   Anaphylaxis secondary to Ibuprofen  HO Asthma      PLAN:    CNS: SAT today; On fentanyl; Taper versed; Propofol if needed    HEENT: oral care.     PULMONARY: HOB at 45 degrees; Nebs q4h. c/w Solumedrol. Vent changes: Decrease FiO2 to 50% and then taper;     CARDIOVASCULAR: Keep I=O.    GI: GI prophylaxis.  Feeding as tolerated     RENAL: F/u lytes and replete PRN    INFECTIOUS DISEASE: Pan cx    HEMATOLOGICAL:  DVT prophylaxis.    ENDOCRINE:  Follow up FS.  Insulin protocol if needed.    MUSCULOSKELETAL: Frequent positioning     MICU monitoring for now IMPRESSION:   Cardiac arrest   Anaphylaxis secondary to Ibuprofen  HO Asthma      PLAN:    CNS: SAT today; On fentanyl; Taper versed; Propofol if needed    HEENT: oral care.     PULMONARY: HOB at 45 degrees; Nebs q4h. c/w Solumedrol. Vent changes: Decrease FiO2 to 50% and then taper; SBT     CARDIOVASCULAR: Keep I=O.    GI: GI prophylaxis.  Feeding as tolerated if remains intubated;     RENAL: F/u lytes and replete PRN    INFECTIOUS DISEASE: Pan cx    HEMATOLOGICAL:  DVT prophylaxis.    ENDOCRINE:  Follow up FS.  Insulin protocol if needed.    MUSCULOSKELETAL: Frequent positioning     MICU monitoring for now IMPRESSION:   Cardiac arrest   Anaphylaxis secondary to Ibuprofen  HO Asthma      PLAN:    CNS: SAT today; On fentanyl; Taper versed; Propofol if needed    HEENT: oral care.     PULMONARY: HOB at 45 degrees; Nebs q4h. c/w Solumedrol. Vent changes: Decrease FiO2 to 50% and then taper; SBT     CARDIOVASCULAR: Keep I=O. LR bolus 500cc     GI: GI prophylaxis.  Feeding as tolerated if remains intubated;     RENAL: F/u lytes and replete PRN    INFECTIOUS DISEASE: Pan cx    HEMATOLOGICAL:  DVT prophylaxis.    ENDOCRINE:  Follow up FS.  Insulin protocol if needed.    MUSCULOSKELETAL: Frequent positioning     MICU monitoring for now

## 2018-12-26 NOTE — H&P ADULT - ASSESSMENT
CT Chest  Right lower lobe consolidated opacity with scattered air bronchograms. Additionally scattered groundglass nodular opacities in the upper lung fields and left lower lobe. Findings represent a pneumonia in the appropriate clinical setting.    Focal anterior cortical depression deformity of the sternum consistent   with sternal fracture in the setting of CPR    CTH: No acute intracranial abnormality. No definite evidence of parenchymal   edema.    There is some sulcal crowding which is likely normal in this patient;   however, without a comparison study developing edema cannot be complete ruled out. Continued close monitoring and repeat head CT is needed.    Trop 0.01 > 0.15    Impression  Anaphylaxis  Cardiac arresrt  Respiratory failure  Intubated  Leukocytosis  RLL opactity  Hyperglycemia  Elev trop    Plan  Intubated & sedated - fentanyl & versed  Duonebs  IV solumedrol  Famotidine  Check FSG will start insulin - likely undiagnosed Diabetic  Check A1c  DVT ppx  GI ppx (on famotidine) CT Chest  Right lower lobe consolidated opacity with scattered air bronchograms. Additionally scattered groundglass nodular opacities in the upper lung fields and left lower lobe. Findings represent a pneumonia in the appropriate clinical setting.    Focal anterior cortical depression deformity of the sternum consistent   with sternal fracture in the setting of CPR    CTH: No acute intracranial abnormality. No definite evidence of parenchymal   edema.    There is some sulcal crowding which is likely normal in this patient;   however, without a comparison study developing edema cannot be complete ruled out. Continued close monitoring and repeat head CT is needed.    Trop 0.01 > 0.15    Impression  Anaphylaxis  Cardiac arresrt  Respiratory failure  Intubated  Leukocytosis  RLL opactity  Hyperglycemia  Elev trop    Plan  Intubated & sedated - fentanyl & versed  Duonebs  IV solumedrol  Famotidine  Check FSG will start insulin - likely undiagnosed Diabetic  Check A1c  DVT ppx  GI ppx (on famotidine)    LR bolus CT Chest  Right lower lobe consolidated opacity with scattered air bronchograms. Additionally scattered groundglass nodular opacities in the upper lung fields and left lower lobe. Findings represent a pneumonia in the appropriate clinical setting.    Focal anterior cortical depression deformity of the sternum consistent   with sternal fracture in the setting of CPR    CTH: No acute intracranial abnormality. No definite evidence of parenchymal   edema.    There is some sulcal crowding which is likely normal in this patient;   however, without a comparison study developing edema cannot be complete ruled out. Continued close monitoring and repeat head CT is needed.    CT soft tissue neck: Limited study due to lack of IV contrast and anatomic distortion by   endotracheal and orogastric tubes. No gross evidence of airway edema.    Trop 0.01 > 0.15    Impression  Anaphylaxis reaction  Cardiac arrest - likely due to hypoxia  Respiratory failure  Intubated  Leukocytosis  RLL opactity  Hyperglycemia & Glucosuria  Elev trop  Sternal fx due to CPR  Plan  Intubated & sedated - fentanyl & versed  Duonebs  IV solumedrol  Famotidine  Check FSG if uncontrolled will start insulin - possibly undiagnosed Diabetic  Check A1c  Thick secretions - f/u sputum culture  DVT ppx  GI ppx (on famotidine)  NGT feeding    LR bolus

## 2018-12-26 NOTE — H&P ADULT - PSH
Nasal polyps  REMOVAL OF NASAL POLYPS 2015 History of cholecystectomy    Nasal polyps  REMOVAL OF NASAL POLYPS 2015

## 2018-12-26 NOTE — H&P ADULT - HISTORY OF PRESENT ILLNESS
58 F, hx of asthma/COPD & NARDA, brought in by EMS for anaphylactic shock. She took advil and then she developed facial redness and sudden onset SOB . Family then called the EMS and she was intubated in the ambulance. She took advil 2 months ago and she usually develop mild hives with advil. This is the first time she developed anaphylactic shock.    She was found to be in asysytole in ER, did CPR and 2 rounds of epinephrine given. Reintubated in ER.    She was fully functional without any complaints recently until this episode.      Hx from chart notes 58 F, hx of asthma/COPD & NARDA, x-smoker, brought in by EMS for anaphylactic shock. She took advil and then she developed facial redness and sudden onset SOB . Family then called the EMS and she was intubated in the ambulance. She took advil 2 months ago and she usually develop mild hives with advil. This is the first time she developed anaphylactic shock.    She was found to be in asysytole in ER, did CPR and 2 rounds of epinephrine given. Reintubated in ER.    She was fully functional without any complaints recently until this episode.     Hx from chart notes

## 2018-12-26 NOTE — CONSULT NOTE ADULT - SUBJECTIVE AND OBJECTIVE BOX
Patient is a 58y old  Female who presents with a chief complaint of anaphylaxis, cardiac arrest (26 Dec 2018 08:22)      HPI:  58 F, hx of asthma/COPD & NARDA, brought in by EMS for anaphylactic shock. She took advil and then she developed facial redness and sudden onset SOB . Family then called the EMS and she was intubated in the ambulance. She took advil 2 months ago and she usually develop mild hives with advil. This is the first time she developed anaphylactic shock.    She was found to be in asysytole in ER, did CPR and 2 rounds of epinephrine given. Reintubated in ER.    She was fully functional without any complaints recently until this episode.      Hx from chart notes (26 Dec 2018 08:22)    PAST MEDICAL & SURGICAL HISTORY:  NARDA (obstructive sleep apnea): DENIES CPAP OR BIPAP (SEE PULMONARY EVAL FOR POST-OP RECOMMENDATIONS)  Dyspnea: W/ EXERTION  COPD (chronic obstructive pulmonary disease)  Asthma: LAST ATTACK 2/18, WAS HOSPITALIZED  Nasal polyps: REMOVAL OF NASAL POLYPS 2015      SOCIAL HX:   Exsmoker    FAMILY HISTORY:  Family history of breast cancer (Mother)  CVA (cerebral vascular accident) (Father)    Review of system:  See HPI    Allergies    ibuprofen (Rash)  predniSONE (Rash)    Intolerances    PHYSICAL EXAM    ICU Vital Signs Last 24 Hrs  T(C): 36.6 (26 Dec 2018 07:10), Max: 36.7 (26 Dec 2018 03:00)  T(F): 97.9 (26 Dec 2018 07:10), Max: 98.1 (26 Dec 2018 03:00)  HR: 88 (26 Dec 2018 08:19) (84 - 118)  BP: 101/62 (26 Dec 2018 05:59) (99/60 - 199/122)  BP(mean): 77 (26 Dec 2018 05:59) (76 - 92)  RR: 25 (26 Dec 2018 07:10) (12 - 26)  SpO2: 98% (26 Dec 2018 08:19) (98% - 100%)    I&O's Detail    25 Dec 2018 07:01  -  26 Dec 2018 07:00  --------------------------------------------------------  IN:    fentaNYL  Infusion: 98.4 mL    midazolam Infusion: 33.7 mL  Total IN: 132.1 mL    OUT:    Indwelling Catheter - Urethral: 700 mL  Total OUT: 700 mL    Total NET: -567.9 mL      26 Dec 2018 07:01  -  26 Dec 2018 09:08  --------------------------------------------------------  IN:    fentaNYL  Infusion: 16.4 mL    midazolam Infusion: 8 mL  Total IN: 24.4 mL    OUT:    Indwelling Catheter - Urethral: 120 mL  Total OUT: 120 mL    Total NET: -95.6 mL    General: Comfortable in bed  HEENT:    Lymph node: No palpable LN             Lungs: CTA  Cardiovascular: RRR, S1S2  Abdomen: BS+ve; soft non tender  Extremities: No LE edema  Skin:  No evident Rash  Neurological:  AAOx3; No focal deficit    LABS:                          13.0   13.83 )-----------( 243      ( 26 Dec 2018 05:59 )             39.9   12-26    141  |  103  |  17  ----------------------------<  164<H>  4.9   |  27  |  0.9    Ca    8.6      26 Dec 2018 05:59  Mg     2.7     12-26    TPro  6.3  /  Alb  4.0  /  TBili  0.3  /  DBili  x   /  AST  87<H>  /  ALT  123<H>  /  AlkPhos  159<H>  12-26      Urinalysis Basic - ( 25 Dec 2018 18:41 )    Color: Yellow / Appearance: Clear / SG: >=1.030 / pH: x  Gluc: x / Ketone: Negative  / Bili: Negative / Urobili: 0.2 mg/dL   Blood: x / Protein: 100 mg/dL / Nitrite: Negative   Leuk Esterase: Negative / RBC: 1-2 /HPF / WBC 6-10 /HPF   Sq Epi: x / Non Sq Epi: Moderate /HPF / Bacteria: Few      CARDIAC MARKERS ( 26 Dec 2018 05:59 )  x     / 0.13 ng/mL / x     / x     / x      CARDIAC MARKERS ( 25 Dec 2018 22:00 )  x     / 0.15 ng/mL / x     / x     / 5.6 ng/mL  CARDIAC MARKERS ( 25 Dec 2018 17:00 )  x     / <0.01 ng/mL / 170 U/L / x     / x        LIVER FUNCTIONS - ( 26 Dec 2018 05:59 )  Alb: 4.0 g/dL / Pro: 6.3 g/dL / ALK PHOS: 159 U/L / ALT: 123 U/L / AST: 87 U/L / GGT: x           Mode: Auto Mode: PRVC/ Volume Support  RR (machine): 24  TV (machine): 400  FiO2: 60  PEEP: 5  MAP: 9  PIP: 18                                       ABG - ( 26 Dec 2018 05:03 )  pH, Arterial: 7.35  pH, Blood: x     /  pCO2: 48    /  pO2: 167   / HCO3: 27    / Base Excess: 0.4   /  SaO2: 98          MEDICATIONS  (STANDING):  ALBUTerol/ipratropium (CFC free) Inhaler. 1 Puff(s) Inhalation four times a day  chlorhexidine 0.12% Liquid 15 milliLiter(s) Oral Mucosa two times a day  enoxaparin Injectable 40 milliGRAM(s) SubCutaneous daily  famotidine Injectable 20 milliGRAM(s) IV Push two times a day  fentaNYL    Injectable 50 MICROGram(s) IV Push once  fentaNYL   Infusion 0.5 MICROgram(s)/kG/Hr (4.535 mL/Hr) IV Continuous <Continuous>  methylPREDNISolone sodium succinate Injectable 60 milliGRAM(s) IV Push two times a day  midazolam Infusion 0.02 mG/kG/Hr (1.814 mL/Hr) IV Continuous <Continuous>    MEDICATIONS  (PRN):    Radiology:  < from: CT Chest No Cont (12.25.18 @ 18:51) >  IMPRESSION:    Right lower lobe consolidated opacity with scattered air bronchograms.   Additionally scattered groundglassnodular opacities in the upper lung   fields and left lower lobe. Findings represent a pneumonia in the   appropriate clinical setting.    Focal anterior cortical depression deformity of the sternum consistent   with sternal fracture in the setting of CPR    < end of copied text >    Chest xray: ETT ok; OG tube below diaphragm; Right base hazy opacity Patient is a 58y old  Female who presents with a chief complaint of anaphylaxis, cardiac arrest (26 Dec 2018 08:22)      HPI:  58 F, hx of asthma/COPD & NARDA, brought in by EMS for anaphylactic shock. She took advil and then she developed facial redness and sudden onset SOB . Family then called the EMS and she was intubated in the ambulance. She took advil 2 months ago and she usually develop mild hives with advil. This is the first time she developed anaphylactic shock.    She was found to be in asysytole in ER, did CPR and 2 rounds of epinephrine given. Reintubated in ER.    She was fully functional without any complaints recently until this episode.      Hx from chart notes (26 Dec 2018 08:22)    Not requiriong pressors;     PAST MEDICAL & SURGICAL HISTORY:  NARDA (obstructive sleep apnea): DENIES CPAP OR BIPAP (SEE PULMONARY EVAL FOR POST-OP RECOMMENDATIONS)  Dyspnea: W/ EXERTION  COPD (chronic obstructive pulmonary disease)  Asthma: LAST ATTACK 2/18, WAS HOSPITALIZED  Nasal polyps: REMOVAL OF NASAL POLYPS 2015    SOCIAL HX:   Exsmoker    FAMILY HISTORY:  Family history of breast cancer (Mother)  CVA (cerebral vascular accident) (Father)    Review of system:  See HPI    Allergies    ibuprofen (Rash)  predniSONE (Rash)    Intolerances    PHYSICAL EXAM    ICU Vital Signs Last 24 Hrs  T(C): 36.6 (26 Dec 2018 07:10), Max: 36.7 (26 Dec 2018 03:00)  T(F): 97.9 (26 Dec 2018 07:10), Max: 98.1 (26 Dec 2018 03:00)  HR: 88 (26 Dec 2018 08:19) (84 - 118)  BP: 101/62 (26 Dec 2018 05:59) (99/60 - 199/122)  BP(mean): 77 (26 Dec 2018 05:59) (76 - 92)  RR: 25 (26 Dec 2018 07:10) (12 - 26)  SpO2: 98% (26 Dec 2018 08:19) (98% - 100%)    I&O's Detail    25 Dec 2018 07:01  -  26 Dec 2018 07:00  --------------------------------------------------------  IN:    fentaNYL  Infusion: 98.4 mL    midazolam Infusion: 33.7 mL  Total IN: 132.1 mL    OUT:    Indwelling Catheter - Urethral: 700 mL  Total OUT: 700 mL    Total NET: -567.9 mL      26 Dec 2018 07:01  -  26 Dec 2018 09:08  --------------------------------------------------------  IN:    fentaNYL  Infusion: 16.4 mL    midazolam Infusion: 8 mL  Total IN: 24.4 mL    OUT:    Indwelling Catheter - Urethral: 120 mL  Total OUT: 120 mL    Total NET: -95.6 mL    General: Comfortable in bed; Sedated   HEENT:  ET +   Lymph node: No palpable LN             Lungs: CTA  Cardiovascular: RRR, S1S2  Abdomen: BS+ve; soft non tender  Extremities: No LE edema  Skin:  No evident Rash  Neurological:  No focal deficit; Responds to commands     LABS:                          13.0   13.83 )-----------( 243      ( 26 Dec 2018 05:59 )             39.9   12-26    141  |  103  |  17  ----------------------------<  164<H>  4.9   |  27  |  0.9    Ca    8.6      26 Dec 2018 05:59  Mg     2.7     12-26    TPro  6.3  /  Alb  4.0  /  TBili  0.3  /  DBili  x   /  AST  87<H>  /  ALT  123<H>  /  AlkPhos  159<H>  12-26    Urinalysis Basic - ( 25 Dec 2018 18:41 )    Color: Yellow / Appearance: Clear / SG: >=1.030 / pH: x  Gluc: x / Ketone: Negative  / Bili: Negative / Urobili: 0.2 mg/dL   Blood: x / Protein: 100 mg/dL / Nitrite: Negative   Leuk Esterase: Negative / RBC: 1-2 /HPF / WBC 6-10 /HPF   Sq Epi: x / Non Sq Epi: Moderate /HPF / Bacteria: Few      CARDIAC MARKERS ( 26 Dec 2018 05:59 )  x     / 0.13 ng/mL / x     / x     / x      CARDIAC MARKERS ( 25 Dec 2018 22:00 )  x     / 0.15 ng/mL / x     / x     / 5.6 ng/mL  CARDIAC MARKERS ( 25 Dec 2018 17:00 )  x     / <0.01 ng/mL / 170 U/L / x     / x        LIVER FUNCTIONS - ( 26 Dec 2018 05:59 )  Alb: 4.0 g/dL / Pro: 6.3 g/dL / ALK PHOS: 159 U/L / ALT: 123 U/L / AST: 87 U/L / GGT: x           Mode: Auto Mode: PRVC/ Volume Support  RR (machine): 24  TV (machine): 400  FiO2: 60  PEEP: 5  MAP: 9  PIP: 18                                       ABG - ( 26 Dec 2018 05:03 )  pH, Arterial: 7.35  pH, Blood: x     /  pCO2: 48    /  pO2: 167   / HCO3: 27    / Base Excess: 0.4   /  SaO2: 98          MEDICATIONS  (STANDING):  ALBUTerol/ipratropium (CFC free) Inhaler. 1 Puff(s) Inhalation four times a day  chlorhexidine 0.12% Liquid 15 milliLiter(s) Oral Mucosa two times a day  enoxaparin Injectable 40 milliGRAM(s) SubCutaneous daily  famotidine Injectable 20 milliGRAM(s) IV Push two times a day  fentaNYL    Injectable 50 MICROGram(s) IV Push once  fentaNYL   Infusion 0.5 MICROgram(s)/kG/Hr (4.535 mL/Hr) IV Continuous <Continuous>  methylPREDNISolone sodium succinate Injectable 60 milliGRAM(s) IV Push two times a day  midazolam Infusion 0.02 mG/kG/Hr (1.814 mL/Hr) IV Continuous <Continuous>    MEDICATIONS  (PRN):    Radiology:  < from: CT Chest No Cont (12.25.18 @ 18:51) >  IMPRESSION:    Right lower lobe consolidated opacity with scattered air bronchograms.   Additionally scattered groundglassnodular opacities in the upper lung   fields and left lower lobe. Findings represent a pneumonia in the   appropriate clinical setting.    Focal anterior cortical depression deformity of the sternum consistent   with sternal fracture in the setting of CPR    < end of copied text >    Chest xray: ETT ok; OG tube below diaphragm; Right base hazy opacity

## 2018-12-26 NOTE — H&P ADULT - NSHPLABSRESULTS_GEN_ALL_CORE
13.0   13.83 )-----------( 243      ( 26 Dec 2018 05:59 )             39.9       Hemoglobin: 13.0 g/dL (12-26 @ 05:59)  Hemoglobin: 13.7 g/dL (12-25 @ 17:00)      12-26    141  |  103  |  17  ----------------------------<  164<H>  4.9   |  27  |  0.9    Ca    8.6      26 Dec 2018 05:59  Mg     2.7     12-26    TPro  6.3  /  Alb  4.0  /  TBili  0.3  /  DBili  x   /  AST  87<H>  /  ALT  123<H>  /  AlkPhos  159<H>  12-26        Creatinine Trend: 0.9<--, 1.1<--  eGFR if Non African American: 71 mL/min/1.73M2 (12-26-18 @ 05:59)  eGFR if African American: 82 mL/min/1.73M2 (12-26-18 @ 05:59)  eGFR if Non African American: 56 mL/min/1.73M2 (12-25-18 @ 17:00)  eGFR if : 65 mL/min/1.73M2 (12-25-18 @ 17:00)    Urinalysis Basic - ( 25 Dec 2018 18:41 )    Color: Yellow / Appearance: Clear / SG: >=1.030 / pH: x  Gluc: x / Ketone: Negative  / Bili: Negative / Urobili: 0.2 mg/dL   Blood: x / Protein: 100 mg/dL / Nitrite: Negative   Leuk Esterase: Negative / RBC: 1-2 /HPF / WBC 6-10 /HPF   Sq Epi: x / Non Sq Epi: Moderate /HPF / Bacteria: Few          05:03 - ABG - pH: 7.35  |  pCO2: 48    |  pO2: 167   | Lactate:       | BE: 0.4    17:14 - ABG - pH: 7.16  |  pCO2: 72    |  pO2: 333   | Lactate:       | BE: -5.1         Hemoglobin A1C         Lactate Trend      CARDIAC MARKERS ( 25 Dec 2018 22:00 )  x     / 0.15 ng/mL / x     / x     / 5.6 ng/mL  CARDIAC MARKERS ( 25 Dec 2018 17:00 )  x     / <0.01 ng/mL / 170 U/L / x     / x            CAPILLARY BLOOD GLUCOSE      POCT Blood Glucose.: 239 mg/dL (25 Dec 2018 16:18)

## 2018-12-26 NOTE — H&P ADULT - NSHPPHYSICALEXAM_GEN_ALL_CORE
ICU Vital Signs Last 24 Hrs  T(C): 36.6 (26 Dec 2018 07:10), Max: 36.7 (26 Dec 2018 03:00)  T(F): 97.9 (26 Dec 2018 07:10), Max: 98.1 (26 Dec 2018 03:00)  HR: 88 (26 Dec 2018 08:19) (84 - 118)  BP: 101/62 (26 Dec 2018 05:59) (99/60 - 199/122)  BP(mean): 77 (26 Dec 2018 05:59) (76 - 92)  ABP: --  ABP(mean): --  RR: 25 (26 Dec 2018 07:10) (12 - 26)  SpO2: 98% (26 Dec 2018 08:19) (98% - 100%)    PHYSICAL EXAM:  GENERAL: NAD, intubated & sedated  HEAD:  Atraumatic  EYES: PERRL, symmetric  NECK: Supple, No JVD  CHEST/LUNG: Clear to auscultation bilaterally; No wheeze; No crackles; No accessory muscles used  HEART: distant heart sounts, radial pulse detected  ABDOMEN: Soft, Nontender, Nondistended; Bowel sounds present; No guarding  EXTREMITIES:  NO LE edema  PSYCH: sedated  NEUROLOGY: PERRL  SKIN: No rashes or lesions

## 2018-12-27 LAB
ALBUMIN SERPL ELPH-MCNC: 3.7 G/DL — SIGNIFICANT CHANGE UP (ref 3.5–5.2)
ALP SERPL-CCNC: 128 U/L — HIGH (ref 30–115)
ALT FLD-CCNC: 73 U/L — HIGH (ref 0–41)
ANION GAP SERPL CALC-SCNC: 10 MMOL/L — SIGNIFICANT CHANGE UP (ref 7–14)
AST SERPL-CCNC: 26 U/L — SIGNIFICANT CHANGE UP (ref 0–41)
BASE EXCESS BLDA CALC-SCNC: 3.2 MMOL/L — HIGH (ref -2–2)
BASOPHILS # BLD AUTO: 0 K/UL — SIGNIFICANT CHANGE UP (ref 0–0.2)
BASOPHILS NFR BLD AUTO: 0 % — SIGNIFICANT CHANGE UP (ref 0–1)
BILIRUB SERPL-MCNC: 0.3 MG/DL — SIGNIFICANT CHANGE UP (ref 0.2–1.2)
BUN SERPL-MCNC: 24 MG/DL — HIGH (ref 10–20)
CALCIUM SERPL-MCNC: 8.9 MG/DL — SIGNIFICANT CHANGE UP (ref 8.5–10.1)
CHLORIDE SERPL-SCNC: 105 MMOL/L — SIGNIFICANT CHANGE UP (ref 98–110)
CO2 SERPL-SCNC: 28 MMOL/L — SIGNIFICANT CHANGE UP (ref 17–32)
CREAT SERPL-MCNC: 0.8 MG/DL — SIGNIFICANT CHANGE UP (ref 0.7–1.5)
EOSINOPHIL # BLD AUTO: 0 K/UL — SIGNIFICANT CHANGE UP (ref 0–0.7)
EOSINOPHIL NFR BLD AUTO: 0 % — SIGNIFICANT CHANGE UP (ref 0–8)
ESTIMATED AVERAGE GLUCOSE: 111 MG/DL — SIGNIFICANT CHANGE UP (ref 68–114)
GLUCOSE SERPL-MCNC: 150 MG/DL — HIGH (ref 70–99)
GRAM STN FLD: SIGNIFICANT CHANGE UP
HBA1C BLD-MCNC: 5.5 % — SIGNIFICANT CHANGE UP (ref 4–5.6)
HCO3 BLDA-SCNC: 28 MMOL/L — HIGH (ref 23–27)
HCT VFR BLD CALC: 38.3 % — SIGNIFICANT CHANGE UP (ref 37–47)
HGB BLD-MCNC: 12.3 G/DL — SIGNIFICANT CHANGE UP (ref 12–16)
HOROWITZ INDEX BLDA+IHG-RTO: 40 — SIGNIFICANT CHANGE UP
IMM GRANULOCYTES NFR BLD AUTO: 0.3 % — SIGNIFICANT CHANGE UP (ref 0.1–0.3)
LYMPHOCYTES # BLD AUTO: 1.05 K/UL — LOW (ref 1.2–3.4)
LYMPHOCYTES # BLD AUTO: 9.2 % — LOW (ref 20.5–51.1)
MAGNESIUM SERPL-MCNC: 2.6 MG/DL — HIGH (ref 1.8–2.4)
MCHC RBC-ENTMCNC: 28.9 PG — SIGNIFICANT CHANGE UP (ref 27–31)
MCHC RBC-ENTMCNC: 32.1 G/DL — SIGNIFICANT CHANGE UP (ref 32–37)
MCV RBC AUTO: 89.9 FL — SIGNIFICANT CHANGE UP (ref 81–99)
MONOCYTES # BLD AUTO: 0.93 K/UL — HIGH (ref 0.1–0.6)
MONOCYTES NFR BLD AUTO: 8.2 % — SIGNIFICANT CHANGE UP (ref 1.7–9.3)
NEUTROPHILS # BLD AUTO: 9.38 K/UL — HIGH (ref 1.4–6.5)
NEUTROPHILS NFR BLD AUTO: 82.3 % — HIGH (ref 42.2–75.2)
PCO2 BLDA: 44 MMHG — HIGH (ref 38–42)
PH BLDA: 7.41 — SIGNIFICANT CHANGE UP (ref 7.38–7.42)
PHOSPHATE SERPL-MCNC: 3 MG/DL — SIGNIFICANT CHANGE UP (ref 2.1–4.9)
PLATELET # BLD AUTO: 212 K/UL — SIGNIFICANT CHANGE UP (ref 130–400)
PO2 BLDA: 86 MMHG — SIGNIFICANT CHANGE UP (ref 78–95)
POTASSIUM SERPL-MCNC: 5.1 MMOL/L — HIGH (ref 3.5–5)
POTASSIUM SERPL-SCNC: 5.1 MMOL/L — HIGH (ref 3.5–5)
PROT SERPL-MCNC: 6.2 G/DL — SIGNIFICANT CHANGE UP (ref 6–8)
RBC # BLD: 4.26 M/UL — SIGNIFICANT CHANGE UP (ref 4.2–5.4)
RBC # FLD: 13.6 % — SIGNIFICANT CHANGE UP (ref 11.5–14.5)
SAO2 % BLDA: 96 % — SIGNIFICANT CHANGE UP (ref 94–98)
SODIUM SERPL-SCNC: 143 MMOL/L — SIGNIFICANT CHANGE UP (ref 135–146)
SPECIMEN SOURCE: SIGNIFICANT CHANGE UP
WBC # BLD: 11.39 K/UL — HIGH (ref 4.8–10.8)
WBC # FLD AUTO: 11.39 K/UL — HIGH (ref 4.8–10.8)

## 2018-12-27 RX ORDER — BUDESONIDE AND FORMOTEROL FUMARATE DIHYDRATE 160; 4.5 UG/1; UG/1
2 AEROSOL RESPIRATORY (INHALATION)
Qty: 0 | Refills: 0 | Status: DISCONTINUED | OUTPATIENT
Start: 2018-12-27 | End: 2018-12-30

## 2018-12-27 RX ORDER — PANTOPRAZOLE SODIUM 20 MG/1
40 TABLET, DELAYED RELEASE ORAL
Qty: 0 | Refills: 0 | Status: DISCONTINUED | OUTPATIENT
Start: 2018-12-27 | End: 2018-12-30

## 2018-12-27 RX ADMIN — BUDESONIDE AND FORMOTEROL FUMARATE DIHYDRATE 2 PUFF(S): 160; 4.5 AEROSOL RESPIRATORY (INHALATION) at 20:24

## 2018-12-27 RX ADMIN — AMPICILLIN SODIUM AND SULBACTAM SODIUM 200 GRAM(S): 250; 125 INJECTION, POWDER, FOR SUSPENSION INTRAMUSCULAR; INTRAVENOUS at 05:23

## 2018-12-27 RX ADMIN — Medication 60 MILLIGRAM(S): at 05:22

## 2018-12-27 RX ADMIN — ENOXAPARIN SODIUM 40 MILLIGRAM(S): 100 INJECTION SUBCUTANEOUS at 14:33

## 2018-12-27 RX ADMIN — Medication 3 MILLILITER(S): at 13:32

## 2018-12-27 RX ADMIN — AMPICILLIN SODIUM AND SULBACTAM SODIUM 200 GRAM(S): 250; 125 INJECTION, POWDER, FOR SUSPENSION INTRAMUSCULAR; INTRAVENOUS at 00:16

## 2018-12-27 RX ADMIN — AMPICILLIN SODIUM AND SULBACTAM SODIUM 200 GRAM(S): 250; 125 INJECTION, POWDER, FOR SUSPENSION INTRAMUSCULAR; INTRAVENOUS at 23:39

## 2018-12-27 RX ADMIN — Medication 1 DROP(S): at 05:24

## 2018-12-27 RX ADMIN — MONTELUKAST 10 MILLIGRAM(S): 4 TABLET, CHEWABLE ORAL at 21:44

## 2018-12-27 RX ADMIN — FAMOTIDINE 20 MILLIGRAM(S): 10 INJECTION INTRAVENOUS at 05:22

## 2018-12-27 RX ADMIN — AMPICILLIN SODIUM AND SULBACTAM SODIUM 200 GRAM(S): 250; 125 INJECTION, POWDER, FOR SUSPENSION INTRAMUSCULAR; INTRAVENOUS at 14:33

## 2018-12-27 RX ADMIN — Medication 60 MILLIGRAM(S): at 18:16

## 2018-12-27 RX ADMIN — AMPICILLIN SODIUM AND SULBACTAM SODIUM 200 GRAM(S): 250; 125 INJECTION, POWDER, FOR SUSPENSION INTRAMUSCULAR; INTRAVENOUS at 18:16

## 2018-12-27 RX ADMIN — CHLORHEXIDINE GLUCONATE 15 MILLILITER(S): 213 SOLUTION TOPICAL at 05:22

## 2018-12-27 RX ADMIN — Medication 3 MILLILITER(S): at 23:40

## 2018-12-27 NOTE — PROGRESS NOTE ADULT - SUBJECTIVE AND OBJECTIVE BOX
SUBJECTIVE:    Patient is a 58y old Female who presents with a chief complaint of anaphylaxis, cardiac arrest (27 Dec 2018 10:56)    Currently admitted to medicine with the primary diagnosis of Cardiopulmonary arrest with successful resuscitation     Today is hospital day 2d. This morning she is resting comfortably in bed and reports no new issues or overnight events.     PAST MEDICAL & SURGICAL HISTORY  NARDA (obstructive sleep apnea): DENIES CPAP OR BIPAP (SEE PULMONARY EVAL FOR POST-OP RECOMMENDATIONS)  Dyspnea: W/ EXERTION  COPD (chronic obstructive pulmonary disease)  Asthma: LAST ATTACK 2/18, WAS HOSPITALIZED  History of cholecystectomy  Nasal polyps: REMOVAL OF NASAL POLYPS 2015    SOCIAL HISTORY:  Negative for smoking/alcohol/drug use.     ALLERGIES:  ibuprofen (Rash)  predniSONE (Rash)    MEDICATIONS:  STANDING MEDICATIONS  ALBUTerol/ipratropium for Nebulization 3 milliLiter(s) Nebulizer every 4 hours  ampicillin/sulbactam  IVPB      ampicillin/sulbactam  IVPB 3 Gram(s) IV Intermittent every 6 hours  artificial  tears Solution 1 Drop(s) Both EYES two times a day  buDESOnide 160 MICROgram(s)/formoterol 4.5 MICROgram(s) Inhaler 2 Puff(s) Inhalation two times a day  chlorhexidine 0.12% Liquid 15 milliLiter(s) Oral Mucosa two times a day  dexmedetomidine Infusion 0.2 MICROgram(s)/kG/Hr IV Continuous <Continuous>  enoxaparin Injectable 40 milliGRAM(s) SubCutaneous daily  fentaNYL    Injectable 50 MICROGram(s) IV Push once  fentaNYL   Infusion 0.5 MICROgram(s)/kG/Hr IV Continuous <Continuous>  methylPREDNISolone sodium succinate Injectable 60 milliGRAM(s) IV Push two times a day  midazolam Infusion 0.02 mG/kG/Hr IV Continuous <Continuous>  montelukast 10 milliGRAM(s) Oral at bedtime  pantoprazole    Tablet 40 milliGRAM(s) Oral before breakfast    PRN MEDICATIONS    VITALS:   T(F): 98.2  HR: 70  BP: 115/57  RR: 20  SpO2: 97%    LABS:                        12.3   11.39 )-----------( 212      ( 27 Dec 2018 06:44 )             38.3     12-27    143  |  105  |  24<H>  ----------------------------<  150<H>  5.1<H>   |  28  |  0.8    Ca    8.9      27 Dec 2018 06:44  Phos  3.0     12-27  Mg     2.6     12-27    TPro  6.2  /  Alb  3.7  /  TBili  0.3  /  DBili  x   /  AST  26  /  ALT  73<H>  /  AlkPhos  128<H>  12-27      Urinalysis Basic - ( 25 Dec 2018 18:41 )    Color: Yellow / Appearance: Clear / SG: >=1.030 / pH: x  Gluc: x / Ketone: Negative  / Bili: Negative / Urobili: 0.2 mg/dL   Blood: x / Protein: 100 mg/dL / Nitrite: Negative   Leuk Esterase: Negative / RBC: 1-2 /HPF / WBC 6-10 /HPF   Sq Epi: x / Non Sq Epi: Moderate /HPF / Bacteria: Few      ABG - ( 27 Dec 2018 09:03 )  pH, Arterial: 7.41  pH, Blood: x     /  pCO2: 44    /  pO2: 86    / HCO3: 28    / Base Excess: 3.2   /  SaO2: 96                    Culture - Sputum (collected 26 Dec 2018 12:55)  Source: .Sputum Sputum  Gram Stain (27 Dec 2018 06:57):    Few Squamous epithelial cells per low power field    Few polymorphonuclear leukocytes per low power field    Few Yeast like cells per oil power field    Numerous Gram Variable Rods per oil power field    Numerous Gram positive cocci in pairs, chains and clusters per oil power    field      CARDIAC MARKERS ( 26 Dec 2018 05:59 )  x     / 0.13 ng/mL / x     / x     / x      CARDIAC MARKERS ( 25 Dec 2018 22:00 )  x     / 0.15 ng/mL / x     / x     / 5.6 ng/mL  CARDIAC MARKERS ( 25 Dec 2018 17:00 )  x     / <0.01 ng/mL / 170 U/L / x     / x          RADIOLOGY:    PHYSICAL EXAM:  GEN: No acute distress, seen while intubated, opened eyes, followed commands  LUNGS: minimal wheeze b/l, vent sounds b/l  HEART: S1/S2 present. RRR.   ABD: Soft, non-tender, non-distended. Bowel sounds present  EXT: no LE edema  NEURO: follows commands, alert

## 2018-12-27 NOTE — PROGRESS NOTE ADULT - ATTENDING COMMENTS
Attending Statement: I have personally performed a face to face diagnostic evaluation on this patient. I have reviewed the above note and agree with the history, exam and plan of care, except as I have noted in the text.
Patient seen and evaluated independently medical resident note reviewed, I agree with plan and management, except as I have noted.

## 2018-12-27 NOTE — PROGRESS NOTE ADULT - SUBJECTIVE AND OBJECTIVE BOX
Patient is a 58y old  Female who presents with a chief complaint of anaphylaxis, cardiac arrest (27 Dec 2018 08:44)      OVER NIGHT EVENTS:  s/p extubation in AM; doing well    PHYSICAL EXAM    ICU Vital Signs Last 24 Hrs  T(C): 37.4 (27 Dec 2018 07:05), Max: 37.4 (27 Dec 2018 07:05)  T(F): 99.3 (27 Dec 2018 07:05), Max: 99.3 (27 Dec 2018 07:05)  HR: 70 (27 Dec 2018 06:11) (64 - 106)  BP: 115/57 (27 Dec 2018 06:11) (80/51 - 136/72)  BP(mean): 81 (27 Dec 2018 06:11) (61 - 98)  RR: 24 (27 Dec 2018 09:05) (16 - 27)  SpO2: 97% (27 Dec 2018 06:11) (96% - 98%)    I&O's Detail    26 Dec 2018 07:01  -  27 Dec 2018 07:00  --------------------------------------------------------  IN:    dexmedetomidine Infusion: 61 mL    fentaNYL  Infusion: 185 mL    Glucerna: 660 mL    IV PiggyBack: 200 mL    midazolam Infusion: 12 mL  Total IN: 1118 mL    OUT:    Indwelling Catheter - Urethral: 755 mL    Voided: 110 mL  Total OUT: 865 mL    Total NET: 253 mL      27 Dec 2018 07:01  -  27 Dec 2018 10:57  --------------------------------------------------------  IN:    dexmedetomidine Infusion: 5 mL    fentaNYL  Infusion: 2 mL  Total IN: 7 mL    OUT:    Indwelling Catheter - Urethral: 95 mL  Total OUT: 95 mL    Total NET: -88 mL    General: Comfortable in bed  HEENT:  On Vent mask   Lymph node: No palpable LN             Lungs: Mild bilateral exp wheezing   Cardiovascular: RRR, S1S2  Abdomen: BS+ve; soft non tender  Extremities: No LE edema  Skin:  No evident Rash  Neurological:  AAOx3; No focal deficit      LABS:                          12.3   11.39 )-----------( 212      ( 27 Dec 2018 06:44 )             38.3   12-27    143  |  105  |  24<H>  ----------------------------<  150<H>  5.1<H>   |  28  |  0.8    Ca    8.9      27 Dec 2018 06:44  Phos  3.0     12-27  Mg     2.6     12-27    TPro  6.2  /  Alb  3.7  /  TBili  0.3  /  DBili  x   /  AST  26  /  ALT  73<H>  /  AlkPhos  128<H>  12-27    Urinalysis Basic - ( 25 Dec 2018 18:41 )    Color: Yellow / Appearance: Clear / SG: >=1.030 / pH: x  Gluc: x / Ketone: Negative  / Bili: Negative / Urobili: 0.2 mg/dL   Blood: x / Protein: 100 mg/dL / Nitrite: Negative   Leuk Esterase: Negative / RBC: 1-2 /HPF / WBC 6-10 /HPF   Sq Epi: x / Non Sq Epi: Moderate /HPF / Bacteria: Few    CARDIAC MARKERS ( 26 Dec 2018 05:59 )  x     / 0.13 ng/mL / x     / x     / x      CARDIAC MARKERS ( 25 Dec 2018 22:00 )  x     / 0.15 ng/mL / x     / x     / 5.6 ng/mL  CARDIAC MARKERS ( 25 Dec 2018 17:00 )  x     / <0.01 ng/mL / 170 U/L / x     / x                                                  LIVER FUNCTIONS - ( 27 Dec 2018 06:44 )  Alb: 3.7 g/dL / Pro: 6.2 g/dL / ALK PHOS: 128 U/L / ALT: 73 U/L / AST: 26 U/L / GGT: x             Culture - Sputum (collected 26 Dec 2018 12:55)  Source: .Sputum Sputum  Gram Stain (27 Dec 2018 06:57):    Few Squamous epithelial cells per low power field    Few polymorphonuclear leukocytes per low power field    Few Yeast like cells per oil power field    Numerous Gram Variable Rods per oil power field    Numerous Gram positive cocci in pairs, chains and clusters per oil power    field                                                 Mode: CPAP with PS  FiO2: 40  PEEP: 5  PS: 8                                        ABG - ( 27 Dec 2018 09:03 )  pH, Arterial: 7.41  pH, Blood: x     /  pCO2: 44    /  pO2: 86    / HCO3: 28    / Base Excess: 3.2   /  SaO2: 96          MEDICATIONS  (STANDING):  ALBUTerol/ipratropium for Nebulization 3 milliLiter(s) Nebulizer every 4 hours  ampicillin/sulbactam  IVPB      ampicillin/sulbactam  IVPB 3 Gram(s) IV Intermittent every 6 hours  artificial  tears Solution 1 Drop(s) Both EYES two times a day  chlorhexidine 0.12% Liquid 15 milliLiter(s) Oral Mucosa two times a day  dexmedetomidine Infusion 0.2 MICROgram(s)/kG/Hr (4.715 mL/Hr) IV Continuous <Continuous>  enoxaparin Injectable 40 milliGRAM(s) SubCutaneous daily  famotidine Injectable 20 milliGRAM(s) IV Push two times a day  fentaNYL    Injectable 50 MICROGram(s) IV Push once  fentaNYL   Infusion 0.5 MICROgram(s)/kG/Hr (4.535 mL/Hr) IV Continuous <Continuous>  methylPREDNISolone sodium succinate Injectable 60 milliGRAM(s) IV Push every 8 hours  midazolam Infusion 0.02 mG/kG/Hr (1.814 mL/Hr) IV Continuous <Continuous>  montelukast 10 milliGRAM(s) Oral at bedtime    MEDICATIONS  (PRN):      Radiology:

## 2018-12-27 NOTE — PROGRESS NOTE ADULT - ASSESSMENT
CT Chest  Right lower lobe consolidated opacity with scattered air bronchograms. Additionally scattered groundglass nodular opacities in the upper lung fields and left lower lobe. Findings represent a pneumonia in the appropriate clinical setting.    Focal anterior cortical depression deformity of the sternum consistent   with sternal fracture in the setting of CPR    CTH: No acute intracranial abnormality. No definite evidence of parenchymal   edema.    There is some sulcal crowding which is likely normal in this patient;   however, without a comparison study developing edema cannot be complete ruled out. Continued close monitoring and repeat head CT is needed.    CT soft tissue neck: Limited study due to lack of IV contrast and anatomic distortion by   endotracheal and orogastric tubes. No gross evidence of airway edema.    Trop 0.01 > 0.15    Impression  Anaphylaxis reaction  Cardiac arrest - likely due to hypoxia  Respiratory failure  Intubated  Leukocytosis  RLL opactity  Hyperglycemia & Glucosuria  Elev trop  Sternal fx due to CPR  Plan  Intubated & sedated - fentanyl & versed  Duonebs  IV solumedrol  Famotidine  Check FSG if uncontrolled will start insulin - possibly undiagnosed Diabetic  Check A1c  Thick secretions - f/u sputum culture  DVT ppx  GI ppx (on famotidine)  NGT feeding    Prednisone allergy - gets b/l LE rash CT Chest  Right lower lobe consolidated opacity with scattered air bronchograms. Additionally scattered groundglass nodular opacities in the upper lung fields and left lower lobe. Findings represent a pneumonia in the appropriate clinical setting.    Focal anterior cortical depression deformity of the sternum consistent   with sternal fracture in the setting of CPR    CTH: No acute intracranial abnormality. No definite evidence of parenchymal   edema.    There is some sulcal crowding which is likely normal in this patient;   however, without a comparison study developing edema cannot be complete ruled out. Continued close monitoring and repeat head CT is needed.    CT soft tissue neck: Limited study due to lack of IV contrast and anatomic distortion by   endotracheal and orogastric tubes. No gross evidence of airway edema.    Trop 0.01 > 0.15    Impression  Anaphylaxis reaction  Cardiac arrest - likely due to hypoxia  Respiratory failure  Intubated  Leukocytosis  RLL opactity  Hyperglycemia & Glucosuria  Elev trop  Sternal fx due to CPR    Plan  Extubated now  Duonebs  IV solumedrol  F/u A1c  Had thick secretions, f/u pan cx; c/w unasyn - if negative d/c abx    DVT ppx  GI ppx     Prednisone allergy - gets b/l LE rash

## 2018-12-28 LAB
-  AMIKACIN: SIGNIFICANT CHANGE UP
-  AMOXICILLIN/CLAVULANIC ACID: SIGNIFICANT CHANGE UP
-  AMPICILLIN/SULBACTAM: SIGNIFICANT CHANGE UP
-  AMPICILLIN/SULBACTAM: SIGNIFICANT CHANGE UP
-  AMPICILLIN: SIGNIFICANT CHANGE UP
-  AZTREONAM: SIGNIFICANT CHANGE UP
-  CEFAZOLIN: SIGNIFICANT CHANGE UP
-  CEFAZOLIN: SIGNIFICANT CHANGE UP
-  CEFEPIME: SIGNIFICANT CHANGE UP
-  CEFOXITIN: SIGNIFICANT CHANGE UP
-  CEFTRIAXONE: SIGNIFICANT CHANGE UP
-  CIPROFLOXACIN: SIGNIFICANT CHANGE UP
-  CLINDAMYCIN: SIGNIFICANT CHANGE UP
-  ERTAPENEM: SIGNIFICANT CHANGE UP
-  ERYTHROMYCIN: SIGNIFICANT CHANGE UP
-  GENTAMICIN: SIGNIFICANT CHANGE UP
-  GENTAMICIN: SIGNIFICANT CHANGE UP
-  IMIPENEM: SIGNIFICANT CHANGE UP
-  LEVOFLOXACIN: SIGNIFICANT CHANGE UP
-  MEROPENEM: SIGNIFICANT CHANGE UP
-  OXACILLIN: SIGNIFICANT CHANGE UP
-  PENICILLIN: SIGNIFICANT CHANGE UP
-  PIPERACILLIN/TAZOBACTAM: SIGNIFICANT CHANGE UP
-  RIFAMPIN: SIGNIFICANT CHANGE UP
-  TETRACYCLINE: SIGNIFICANT CHANGE UP
-  TOBRAMYCIN: SIGNIFICANT CHANGE UP
-  TRIMETHOPRIM/SULFAMETHOXAZOLE: SIGNIFICANT CHANGE UP
-  TRIMETHOPRIM/SULFAMETHOXAZOLE: SIGNIFICANT CHANGE UP
-  VANCOMYCIN: SIGNIFICANT CHANGE UP
ALBUMIN SERPL ELPH-MCNC: 3.8 G/DL — SIGNIFICANT CHANGE UP (ref 3.5–5.2)
ALP SERPL-CCNC: 128 U/L — HIGH (ref 30–115)
ALT FLD-CCNC: 48 U/L — HIGH (ref 0–41)
ANION GAP SERPL CALC-SCNC: 10 MMOL/L — SIGNIFICANT CHANGE UP (ref 7–14)
AST SERPL-CCNC: 20 U/L — SIGNIFICANT CHANGE UP (ref 0–41)
BASOPHILS # BLD AUTO: 0.01 K/UL — SIGNIFICANT CHANGE UP (ref 0–0.2)
BASOPHILS NFR BLD AUTO: 0.1 % — SIGNIFICANT CHANGE UP (ref 0–1)
BILIRUB SERPL-MCNC: 0.3 MG/DL — SIGNIFICANT CHANGE UP (ref 0.2–1.2)
BUN SERPL-MCNC: 22 MG/DL — HIGH (ref 10–20)
CALCIUM SERPL-MCNC: 9.2 MG/DL — SIGNIFICANT CHANGE UP (ref 8.5–10.1)
CHLORIDE SERPL-SCNC: 104 MMOL/L — SIGNIFICANT CHANGE UP (ref 98–110)
CO2 SERPL-SCNC: 27 MMOL/L — SIGNIFICANT CHANGE UP (ref 17–32)
CREAT SERPL-MCNC: 0.8 MG/DL — SIGNIFICANT CHANGE UP (ref 0.7–1.5)
CULTURE RESULTS: SIGNIFICANT CHANGE UP
EOSINOPHIL # BLD AUTO: 0 K/UL — SIGNIFICANT CHANGE UP (ref 0–0.7)
EOSINOPHIL NFR BLD AUTO: 0 % — SIGNIFICANT CHANGE UP (ref 0–8)
GLUCOSE SERPL-MCNC: 162 MG/DL — HIGH (ref 70–99)
HCT VFR BLD CALC: 40.6 % — SIGNIFICANT CHANGE UP (ref 37–47)
HGB BLD-MCNC: 12.9 G/DL — SIGNIFICANT CHANGE UP (ref 12–16)
IMM GRANULOCYTES NFR BLD AUTO: 0.5 % — HIGH (ref 0.1–0.3)
LYMPHOCYTES # BLD AUTO: 0.98 K/UL — LOW (ref 1.2–3.4)
LYMPHOCYTES # BLD AUTO: 10.5 % — LOW (ref 20.5–51.1)
MAGNESIUM SERPL-MCNC: 2.3 MG/DL — SIGNIFICANT CHANGE UP (ref 1.8–2.4)
MCHC RBC-ENTMCNC: 28.4 PG — SIGNIFICANT CHANGE UP (ref 27–31)
MCHC RBC-ENTMCNC: 31.8 G/DL — LOW (ref 32–37)
MCV RBC AUTO: 89.4 FL — SIGNIFICANT CHANGE UP (ref 81–99)
METHOD TYPE: SIGNIFICANT CHANGE UP
METHOD TYPE: SIGNIFICANT CHANGE UP
MONOCYTES # BLD AUTO: 0.31 K/UL — SIGNIFICANT CHANGE UP (ref 0.1–0.6)
MONOCYTES NFR BLD AUTO: 3.3 % — SIGNIFICANT CHANGE UP (ref 1.7–9.3)
NEUTROPHILS # BLD AUTO: 7.95 K/UL — HIGH (ref 1.4–6.5)
NEUTROPHILS NFR BLD AUTO: 85.6 % — HIGH (ref 42.2–75.2)
ORGANISM # SPEC MICROSCOPIC CNT: SIGNIFICANT CHANGE UP
PHOSPHATE SERPL-MCNC: 2 MG/DL — LOW (ref 2.1–4.9)
PLATELET # BLD AUTO: 208 K/UL — SIGNIFICANT CHANGE UP (ref 130–400)
POTASSIUM SERPL-MCNC: 4.7 MMOL/L — SIGNIFICANT CHANGE UP (ref 3.5–5)
POTASSIUM SERPL-SCNC: 4.7 MMOL/L — SIGNIFICANT CHANGE UP (ref 3.5–5)
PROT SERPL-MCNC: 6.4 G/DL — SIGNIFICANT CHANGE UP (ref 6–8)
RBC # BLD: 4.54 M/UL — SIGNIFICANT CHANGE UP (ref 4.2–5.4)
RBC # FLD: 13.2 % — SIGNIFICANT CHANGE UP (ref 11.5–14.5)
SODIUM SERPL-SCNC: 141 MMOL/L — SIGNIFICANT CHANGE UP (ref 135–146)
SPECIMEN SOURCE: SIGNIFICANT CHANGE UP
WBC # BLD: 9.3 K/UL — SIGNIFICANT CHANGE UP (ref 4.8–10.8)
WBC # FLD AUTO: 9.3 K/UL — SIGNIFICANT CHANGE UP (ref 4.8–10.8)

## 2018-12-28 RX ADMIN — BUDESONIDE AND FORMOTEROL FUMARATE DIHYDRATE 2 PUFF(S): 160; 4.5 AEROSOL RESPIRATORY (INHALATION) at 19:32

## 2018-12-28 RX ADMIN — AMPICILLIN SODIUM AND SULBACTAM SODIUM 200 GRAM(S): 250; 125 INJECTION, POWDER, FOR SUSPENSION INTRAMUSCULAR; INTRAVENOUS at 23:36

## 2018-12-28 RX ADMIN — Medication 3 MILLILITER(S): at 13:37

## 2018-12-28 RX ADMIN — Medication 60 MILLIGRAM(S): at 05:29

## 2018-12-28 RX ADMIN — Medication 1 DROP(S): at 05:28

## 2018-12-28 RX ADMIN — AMPICILLIN SODIUM AND SULBACTAM SODIUM 200 GRAM(S): 250; 125 INJECTION, POWDER, FOR SUSPENSION INTRAMUSCULAR; INTRAVENOUS at 11:34

## 2018-12-28 RX ADMIN — AMPICILLIN SODIUM AND SULBACTAM SODIUM 200 GRAM(S): 250; 125 INJECTION, POWDER, FOR SUSPENSION INTRAMUSCULAR; INTRAVENOUS at 17:32

## 2018-12-28 RX ADMIN — BUDESONIDE AND FORMOTEROL FUMARATE DIHYDRATE 2 PUFF(S): 160; 4.5 AEROSOL RESPIRATORY (INHALATION) at 08:19

## 2018-12-28 RX ADMIN — Medication 3 MILLILITER(S): at 08:23

## 2018-12-28 RX ADMIN — PANTOPRAZOLE SODIUM 40 MILLIGRAM(S): 20 TABLET, DELAYED RELEASE ORAL at 07:15

## 2018-12-28 RX ADMIN — Medication 60 MILLIGRAM(S): at 17:32

## 2018-12-28 RX ADMIN — MONTELUKAST 10 MILLIGRAM(S): 4 TABLET, CHEWABLE ORAL at 21:01

## 2018-12-28 RX ADMIN — ENOXAPARIN SODIUM 40 MILLIGRAM(S): 100 INJECTION SUBCUTANEOUS at 11:35

## 2018-12-28 RX ADMIN — AMPICILLIN SODIUM AND SULBACTAM SODIUM 200 GRAM(S): 250; 125 INJECTION, POWDER, FOR SUSPENSION INTRAMUSCULAR; INTRAVENOUS at 05:28

## 2018-12-28 RX ADMIN — Medication 3 MILLILITER(S): at 20:15

## 2018-12-28 NOTE — CONSULT NOTE ADULT - SUBJECTIVE AND OBJECTIVE BOX
CARDIOLOGY CONSULT NOTE     CHIEF COMPLAINT/REASON FOR CONSULT:    HPI:  58 F, hx of asthma/COPD & NARDA, x-smoker, brought in by EMS for anaphylactic shock. She took advil and then she developed facial redness and sudden onset SOB . Family then called the EMS and she was intubated in the ambulance. She took advil 2 months ago and she usually develop mild hives with advil. This is the first time she developed anaphylactic shock.    She was found to be in asysytole in ER, did CPR and 2 rounds of epinephrine given. Reintubated in ER.    She was fully functional without any complaints recently until this episode.           PAST MEDICAL & SURGICAL HISTORY:  NARDA (obstructive sleep apnea): DENIES CPAP OR BIPAP (SEE PULMONARY EVAL FOR POST-OP RECOMMENDATIONS)  Dyspnea: W/ EXERTION  COPD (chronic obstructive pulmonary disease)  Asthma: LAST ATTACK 2/18, WAS HOSPITALIZED  History of cholecystectomy  Nasal polyps: REMOVAL OF NASAL POLYPS 2015      Cardiac Risks:   [ ]HTN, [ ] DM, [ ] Smoking, [x ] FH,  [ ] Lipids        MEDICATIONS:  MEDICATIONS  (STANDING):  ALBUTerol/ipratropium for Nebulization 3 milliLiter(s) Nebulizer every 4 hours  ampicillin/sulbactam  IVPB      ampicillin/sulbactam  IVPB 3 Gram(s) IV Intermittent every 6 hours  artificial  tears Solution 1 Drop(s) Both EYES two times a day  buDESOnide 160 MICROgram(s)/formoterol 4.5 MICROgram(s) Inhaler 2 Puff(s) Inhalation two times a day  chlorhexidine 0.12% Liquid 15 milliLiter(s) Oral Mucosa two times a day  dexmedetomidine Infusion 0.2 MICROgram(s)/kG/Hr (4.715 mL/Hr) IV Continuous <Continuous>  enoxaparin Injectable 40 milliGRAM(s) SubCutaneous daily  fentaNYL    Injectable 50 MICROGram(s) IV Push once  fentaNYL   Infusion 0.5 MICROgram(s)/kG/Hr (4.535 mL/Hr) IV Continuous <Continuous>  methylPREDNISolone sodium succinate Injectable 60 milliGRAM(s) IV Push two times a day  midazolam Infusion 0.02 mG/kG/Hr (1.814 mL/Hr) IV Continuous <Continuous>  montelukast 10 milliGRAM(s) Oral at bedtime  pantoprazole    Tablet 40 milliGRAM(s) Oral before breakfast      FAMILY HISTORY:  Family history of breast cancer (Mother)  CVA (cerebral vascular accident) (Father)      SOCIAL HISTORY:      [ ] Marital status   Allergies    Aleve (Anaphylaxis)  ibuprofen (Rash)  predniSONE (Rash)        	    REVIEW OF SYSTEMS:  CONSTITUTIONAL: No fever, weight loss, or fatigue  EYES: No eye pain, visual disturbances, or discharge  ENMT:  No difficulty hearing, tinnitus, vertigo; No sinus or throat pain  NECK: No pain or stiffness  RESPIRATORY: No cough,   CARDIOVASCULAR: No chest pain, palpitations, passing out, dizziness, or leg swelling  GASTROINTESTINAL: No abdominal or epigastric pain. No nausea, vomiting, or hematemesis; No diarrhea or constipation. No melena or hematochezia.  GENITOURINARY: No dysuria, frequency, hematuria, or incontinence  NEUROLOGICAL: No headaches, memory loss, loss of strength, numbness, or tremors  SKIN: No itching, burning, rashes, or lesions   	    PHYSICAL EXAM:  T(C): 35.9 (12-28-18 @ 06:00), Max: 37.4 (12-27-18 @ 15:05)  HR: 79 (12-28-18 @ 06:00) (75 - 107)  BP: 130/68 (12-28-18 @ 06:00) (121/58 - 156/109)  RR: 20 (12-28-18 @ 06:00) (17 - 42)  SpO2: 96% (12-28-18 @ 02:11) (93% - 98%)  Wt(kg): --  I&O's Summary    27 Dec 2018 07:01  -  28 Dec 2018 07:00  --------------------------------------------------------  IN: 207 mL / OUT: 905 mL / NET: -698 mL        Appearance: Normal	  Psychiatry: A & O x 3, Mood & affect appropriate  HEENT:   Normal oral mucosa, PERRL, EOMI	  Lymphatic: No lymphadenopathy  Cardiovascular: Normal S1 S2,RRR, No JVD, No murmurs  Respiratory: Lungs clear to auscultation	  Gastrointestinal:  Soft, Non-tender, + BS	  Skin: No rashes, No ecchymoses, No cyanosis	  Neurologic: Non-focal  Extremities: Normal range of motion, No clubbing, cyanosis or edema  Vascular: Peripheral pulses palpable 2+ bilaterally      ECG:  	st prwp    	  LABS:	 	    CARDIAC MARKERS:                                    12.3   11.39 )-----------( 212      ( 27 Dec 2018 06:44 )             38.3     12-27    143  |  105  |  24<H>  ----------------------------<  150<H>  5.1<H>   |  28  |  0.8    Ca    8.9      27 Dec 2018 06:44  Phos  3.0     12-27  Mg     2.6     12-27    TPro  6.2  /  Alb  3.7  /  TBili  0.3  /  DBili  x   /  AST  26  /  ALT  73<H>  /  AlkPhos  128<H>  12-27

## 2018-12-28 NOTE — PROGRESS NOTE ADULT - ASSESSMENT
57 yo female with PMH that includes asthma / COPD, NARDA, who was admitted 12/26 for cardiac arrest and anaphylactic shock, suspected due to advil. also found with RLL opacity. she underwent reintubation and CPR with ROSC, sustaining sternal fracture in the setting of CPR. she was stabilized, extubated, and transferred to the wards overnight. she was seen by cardiology today      # anaphylactic shock: now resolved  - cont 5 day course iv solumedrol due to prednisone allergy  - Rx and teach epi pen  - advised nsaid can be under different names and in some otc HA / URI remedies      # RLL PNA: suspected aspiration in the setting of intubation / CPR  - cont unasyn  - ID for MSSA sputum  - cont nebs / symbicort for COPD      # Cardiac arrest: likely due to hypoxia. now resolved. noted with sternal fracture likely CPR related. seen by cardiology  - needs f/u cardiology for ischemic eval  - cardio recommneds Outpatient dobutamine SE in 4-6 weeks . Patient aware      # Outpatient followup needed for:  - Hyperglycemia & Glucosuria: a1c ok.  - elevated alkphos, could be due to fracture      # Coordination of care:  - VTE ppx: on LMWH  - lab holiday tomorrow  - plan of care reviewed with patient and family at the bedside

## 2018-12-28 NOTE — PHYSICAL THERAPY INITIAL EVALUATION ADULT - DISCHARGE DISPOSITION, PT EVAL
no skilled PT needs/Patient is independent with bed mobility, transfers, and gait/stair negotiation. As dicussed with patient, no need for PT interventions in this setting.  Will D/C bedside PT at this time, please re-consult if needed.

## 2018-12-28 NOTE — PROGRESS NOTE ADULT - SUBJECTIVE AND OBJECTIVE BOX
CC: f/u anaphylaxis      ROS:        Vital Signs Last 24 Hrs  T(C): 36.1 (28 Dec 2018 14:30), Max: 36.8 (27 Dec 2018 21:05)  T(F): 97 (28 Dec 2018 14:30), Max: 98.3 (27 Dec 2018 21:05)  HR: 88 (28 Dec 2018 14:30) (75 - 88)  BP: 141/69 (28 Dec 2018 14:30) (121/58 - 141/69)  BP(mean): 83 (27 Dec 2018 23:22) (83 - 83)  RR: 20 (28 Dec 2018 14:30) (20 - 20)  SpO2: 96% (28 Dec 2018 02:11) (95% - 96%)      PHYSICAL EXAM:  GENERAL: NAD, well-groomed, well-developed  HEAD = atraumatic, normoocehalic  EYES: conjunctiva and sclera clear  NECK: Supple, No JVD  NERVOUS SYSTEM:  Alert & Oriented X3, Good concentration  CHEST/LUNG: Clear to ausculatation bilaterally; No rales, rhonchi, wheezing, or rubs  HEART: Regular rate and rhythm; No murmurs, rubs, or gallops  ABDOMEN: Soft, Nontender, Nondistended; Bowel sounds present  EXTREMITIES:  BL No clubbing, cyanosis, or edema        DATA:      Culture - Sputum (collected 26 Dec 2018 12:55)  Source: .Sputum Sputum  Gram Stain (27 Dec 2018 06:57):    Few Squamous epithelial cells per low power field    Few polymorphonuclear leukocytes per low power field    Few Yeast like cells per oil power field    Numerous Gram Variable Rods per oil power field    Numerous Gram positive cocci in pairs, chains and clusters per oil power    field  Final Report (28 Dec 2018 17:09):    Moderate Enterobacter cloacae/asburiae    Numerous Staphylococcus aureus    Normal Respiratory Ewa present  Organism: Enterobacter cloacae/absuria  Staphylococcus aureus (28 Dec 2018 17:09)  Organism: Staphylococcus aureus (28 Dec 2018 17:09)      -  Ampicillin/Sulbactam: S <=8/4      -  Cefazolin: S <=4      -  Clindamycin: S 0.5      -  Erythromycin: S 0.5      -  Gentamicin: S <=1 Should not be used as monotherapy      -  Oxacillin: S <=0.25      -  Penicillin: R 4      -  RIF- Rifampin: S <=1 Should not be used as monotherapy      -  Tetra/Doxy: S <=1      -  Trimethoprim/Sulfamethoxazole: S <=0.5/9.5      -  Vancomycin: S 2      Method Type: FRIDA  Organism: Enterobacter cloacae/absuria (28 Dec 2018 17:09)      -  Amikacin: S <=8      -  Amoxicillin/Clavulanic Acid: R >16/8      -  Ampicillin: R >16 These ampicillin results predict results for amoxicillin      -  Ampicillin/Sulbactam: R 8/4      -  Aztreonam: S <=4      -  Cefazolin: R >16      -  Cefepime: S <=2      -  Cefoxitin: R >16      -  Ceftriaxone: S <=1 Enterobacter, Citrobacter, and Serratia may develop resistance during prolonged therapy      -  Ciprofloxacin: S <=0.5      -  Ertapenem: S <=0.5      -  Gentamicin: S <=1      -  Imipenem: S <=1      -  Levofloxacin: S <=1      -  Meropenem: S <=1      -  Piperacillin/Tazobactam: S <=8      -  Tobramycin: S <=2      -  Trimethoprim/Sulfamethoxazole: S <=0.5/9.5      Method Type: FRIDA                              12.9   9.30  )-----------( 208      ( 28 Dec 2018 09:38 )             40.6       12-28    141  |  104  |  22<H>  ----------------------------<  162<H>  4.7   |  27  |  0.8    Ca    9.2      28 Dec 2018 09:38  Phos  2.0     12-28  Mg     2.3     12-28    TPro  6.4  /  Alb  3.8  /  TBili  0.3  /  DBili  x   /  AST  20  /  ALT  48<H>  /  AlkPhos  128<H>  12-28        CXR:   No radiographic evidence of acute cardiopulmonary disease. CC: f/u anaphylaxis      ROS:  no pain or sob or fever or chills or aches  has a runny nose which is chronic      Vital Signs Last 24 Hrs  T(C): 36.1 (28 Dec 2018 14:30), Max: 36.8 (27 Dec 2018 21:05)  T(F): 97 (28 Dec 2018 14:30), Max: 98.3 (27 Dec 2018 21:05)  HR: 88 (28 Dec 2018 14:30) (75 - 88)  BP: 141/69 (28 Dec 2018 14:30) (121/58 - 141/69)  BP(mean): 83 (27 Dec 2018 23:22) (83 - 83)  RR: 20 (28 Dec 2018 14:30) (20 - 20)  SpO2: 96% (28 Dec 2018 02:11) (95% - 96%)      PHYSICAL EXAM:  GENERAL: NAD, well-groomed, well-developed  HEAD = atraumatic, normoocehalic  EYES: conjunctiva and sclera clear  NECK: Supple, No JVD  NERVOUS SYSTEM:  Alert & Oriented X3, Good concentration. gait stable and indeopendent  CHEST/LUNG: Clear to ausculatation bilaterally excpet some coarse sounds over the RLL  HEART: Regular rate and rhythm; No murmurs, rubs, or gallops  ABDOMEN: Soft, Nontender  EXTREMITIES:  BL No clubbing, cyanosis, or edema        DATA:      Culture - Sputum (collected 26 Dec 2018 12:55)  Source: .Sputum Sputum  Gram Stain (27 Dec 2018 06:57):    Few Squamous epithelial cells per low power field    Few polymorphonuclear leukocytes per low power field    Few Yeast like cells per oil power field    Numerous Gram Variable Rods per oil power field    Numerous Gram positive cocci in pairs, chains and clusters per oil power    field  Final Report (28 Dec 2018 17:09):    Moderate Enterobacter cloacae/asburiae    Numerous Staphylococcus aureus    Normal Respiratory Ewa present  Organism: Enterobacter cloacae/absuria  Staphylococcus aureus (28 Dec 2018 17:09)  Organism: Staphylococcus aureus (28 Dec 2018 17:09)      -  Ampicillin/Sulbactam: S <=8/4      -  Cefazolin: S <=4      -  Clindamycin: S 0.5      -  Erythromycin: S 0.5      -  Gentamicin: S <=1 Should not be used as monotherapy      -  Oxacillin: S <=0.25      -  Penicillin: R 4      -  RIF- Rifampin: S <=1 Should not be used as monotherapy      -  Tetra/Doxy: S <=1      -  Trimethoprim/Sulfamethoxazole: S <=0.5/9.5      -  Vancomycin: S 2      Method Type: FRIDA  Organism: Enterobacter cloacae/absuria (28 Dec 2018 17:09)      -  Amikacin: S <=8      -  Amoxicillin/Clavulanic Acid: R >16/8      -  Ampicillin: R >16 These ampicillin results predict results for amoxicillin      -  Ampicillin/Sulbactam: R 8/4      -  Aztreonam: S <=4      -  Cefazolin: R >16      -  Cefepime: S <=2      -  Cefoxitin: R >16      -  Ceftriaxone: S <=1 Enterobacter, Citrobacter, and Serratia may develop resistance during prolonged therapy      -  Ciprofloxacin: S <=0.5      -  Ertapenem: S <=0.5      -  Gentamicin: S <=1      -  Imipenem: S <=1      -  Levofloxacin: S <=1      -  Meropenem: S <=1      -  Piperacillin/Tazobactam: S <=8      -  Tobramycin: S <=2      -  Trimethoprim/Sulfamethoxazole: S <=0.5/9.5      Method Type: FRIDA                              12.9   9.30  )-----------( 208      ( 28 Dec 2018 09:38 )             40.6       12-28    141  |  104  |  22<H>  ----------------------------<  162<H>  4.7   |  27  |  0.8    Ca    9.2      28 Dec 2018 09:38  Phos  2.0     12-28  Mg     2.3     12-28    TPro  6.4  /  Alb  3.8  /  TBili  0.3  /  DBili  x   /  AST  20  /  ALT  48<H>  /  AlkPhos  128<H>  12-28        CXR:   No radiographic evidence of acute cardiopulmonary disease.

## 2018-12-28 NOTE — CONSULT NOTE ADULT - ASSESSMENT
Patient intubated in past. Reportedly positive cardiac enzymes then. Cardiac cath then. Reportedly no significant cad. Now event as above. She got epi. Reviewed enzymes. No MI. Check ekg today. Outpatient dobutamine SE in 4-6 weeks . Patient aware

## 2018-12-28 NOTE — PHYSICAL THERAPY INITIAL EVALUATION ADULT - GENERAL OBSERVATIONS, REHAB EVAL
Chart reviewed. Patient available to be seen for physical therapy, confirmed with nurse. Patient encountered semi-reclined in bed. Patient denies pain at rest, says she has been walking self to bathroom. Agreeable for PT evaluation.

## 2018-12-29 LAB
CULTURE RESULTS: NO GROWTH — SIGNIFICANT CHANGE UP
SPECIMEN SOURCE: SIGNIFICANT CHANGE UP

## 2018-12-29 RX ADMIN — Medication 3 MILLILITER(S): at 06:48

## 2018-12-29 RX ADMIN — BUDESONIDE AND FORMOTEROL FUMARATE DIHYDRATE 2 PUFF(S): 160; 4.5 AEROSOL RESPIRATORY (INHALATION) at 21:25

## 2018-12-29 RX ADMIN — Medication 60 MILLIGRAM(S): at 05:31

## 2018-12-29 RX ADMIN — PANTOPRAZOLE SODIUM 40 MILLIGRAM(S): 20 TABLET, DELAYED RELEASE ORAL at 06:05

## 2018-12-29 RX ADMIN — MONTELUKAST 10 MILLIGRAM(S): 4 TABLET, CHEWABLE ORAL at 21:24

## 2018-12-29 RX ADMIN — AMPICILLIN SODIUM AND SULBACTAM SODIUM 200 GRAM(S): 250; 125 INJECTION, POWDER, FOR SUSPENSION INTRAMUSCULAR; INTRAVENOUS at 05:31

## 2018-12-29 RX ADMIN — AMPICILLIN SODIUM AND SULBACTAM SODIUM 200 GRAM(S): 250; 125 INJECTION, POWDER, FOR SUSPENSION INTRAMUSCULAR; INTRAVENOUS at 23:16

## 2018-12-29 RX ADMIN — Medication 3 MILLILITER(S): at 21:24

## 2018-12-29 RX ADMIN — Medication 60 MILLIGRAM(S): at 17:39

## 2018-12-29 RX ADMIN — AMPICILLIN SODIUM AND SULBACTAM SODIUM 200 GRAM(S): 250; 125 INJECTION, POWDER, FOR SUSPENSION INTRAMUSCULAR; INTRAVENOUS at 11:12

## 2018-12-29 RX ADMIN — AMPICILLIN SODIUM AND SULBACTAM SODIUM 200 GRAM(S): 250; 125 INJECTION, POWDER, FOR SUSPENSION INTRAMUSCULAR; INTRAVENOUS at 17:40

## 2018-12-29 RX ADMIN — BUDESONIDE AND FORMOTEROL FUMARATE DIHYDRATE 2 PUFF(S): 160; 4.5 AEROSOL RESPIRATORY (INHALATION) at 10:56

## 2018-12-29 NOTE — CONSULT NOTE ADULT - ASSESSMENT
IMPRESSION:  No PNA  Bronchitis secondary to ISAIAS  BCx NTD  WBC 9.3    RECOMMENDATIONS  Unasyn 3 gm iv q6h  Could change to po Augmentin 875 mg q12h for 7 days on discharge

## 2018-12-29 NOTE — CONSULT NOTE ADULT - SUBJECTIVE AND OBJECTIVE BOX
PETROS CARRANZA  58y, Female  Allergy: Aleve (Anaphylaxis)  ibuprofen (Rash)  predniSONE (Rash)      HPI:  58 F, hx of asthma/COPD & NARDA, x-smoker, brought in by EMS for anaphylactic shock. She took advil and then she developed facial redness and sudden onset SOB . Family then called the EMS and she was intubated in the ambulance. She took advil 2 months ago and she usually develop mild hives with advil. This is the first time she developed anaphylactic shock.    She was found to be in asysytole in ER, did CPR and 2 rounds of epinephrine given. Reintubated in ER.    She was fully functional without any complaints recently until this episode.   Presently on 4S, alert, responsive    Hx from chart notes (26 Dec 2018 08:22)    FAMILY HISTORY:  Family history of breast cancer (Mother)  CVA (cerebral vascular accident) (Father)    PAST MEDICAL & SURGICAL HISTORY:  NARDA (obstructive sleep apnea): DENIES CPAP OR BIPAP (SEE PULMONARY EVAL FOR POST-OP RECOMMENDATIONS)  Dyspnea: W/ EXERTION  COPD (chronic obstructive pulmonary disease)  Asthma: LAST ATTACK 2/18, WAS HOSPITALIZED  History of cholecystectomy  Nasal polyps: REMOVAL OF NASAL POLYPS 2015        VITALS:  T(F): 96.8, Max: 97.3 (12-28-18 @ 22:18)  HR: 76  BP: 116/72  RR: 20Vital Signs Last 24 Hrs  T(C): 36 (29 Dec 2018 05:33), Max: 36.3 (28 Dec 2018 22:18)  T(F): 96.8 (29 Dec 2018 05:33), Max: 97.3 (28 Dec 2018 22:18)  HR: 76 (29 Dec 2018 05:33) (76 - 88)  BP: 116/72 (29 Dec 2018 05:33) (116/72 - 141/69)  BP(mean): --  RR: 20 (29 Dec 2018 05:33) (16 - 20)  SpO2: --    TESTS & MEASUREMENTS:                        12.9   9.30  )-----------( 208      ( 28 Dec 2018 09:38 )             40.6     12-28    141  |  104  |  22<H>  ----------------------------<  162<H>  4.7   |  27  |  0.8    Ca    9.2      28 Dec 2018 09:38  Phos  2.0     12-28  Mg     2.3     12-28    TPro  6.4  /  Alb  3.8  /  TBili  0.3  /  DBili  x   /  AST  20  /  ALT  48<H>  /  AlkPhos  128<H>  12-28    LIVER FUNCTIONS - ( 28 Dec 2018 09:38 )  Alb: 3.8 g/dL / Pro: 6.4 g/dL / ALK PHOS: 128 U/L / ALT: 48 U/L / AST: 20 U/L / GGT: x             Culture - Blood (collected 12-27-18 @ 16:00)  Source: .Blood None  Preliminary Report (12-29-18 @ 01:02):    No growth to date.    Culture - Sputum (collected 12-26-18 @ 12:55)  Source: .Sputum Sputum  Gram Stain (12-27-18 @ 06:57):    Few Squamous epithelial cells per low power field    Few polymorphonuclear leukocytes per low power field    Few Yeast like cells per oil power field    Numerous Gram Variable Rods per oil power field    Numerous Gram positive cocci in pairs, chains and clusters per oil power    field  Final Report (12-28-18 @ 17:09):    Moderate Enterobacter cloacae/asburiae    Numerous Staphylococcus aureus    Normal Respiratory Ewa present  Organism: Enterobacter cloacae/absuria  Staphylococcus aureus (12-28-18 @ 17:09)  Organism: Staphylococcus aureus (12-28-18 @ 17:09)      -  Ampicillin/Sulbactam: S <=8/4      -  Cefazolin: S <=4      -  Clindamycin: S 0.5      -  Erythromycin: S 0.5      -  Gentamicin: S <=1 Should not be used as monotherapy      -  Oxacillin: S <=0.25      -  Penicillin: R 4      -  RIF- Rifampin: S <=1 Should not be used as monotherapy      -  Tetra/Doxy: S <=1      -  Trimethoprim/Sulfamethoxazole: S <=0.5/9.5      -  Vancomycin: S 2      Method Type: FRIDA  Organism: Enterobacter cloacae/absuria (12-28-18 @ 17:09)      -  Amikacin: S <=8      -  Amoxicillin/Clavulanic Acid: R >16/8      -  Ampicillin: R >16 These ampicillin results predict results for amoxicillin      -  Ampicillin/Sulbactam: R 8/4      -  Aztreonam: S <=4      -  Cefazolin: R >16      -  Cefepime: S <=2      -  Cefoxitin: R >16      -  Ceftriaxone: S <=1 Enterobacter, Citrobacter, and Serratia may develop resistance during prolonged therapy      -  Ciprofloxacin: S <=0.5      -  Ertapenem: S <=0.5      -  Gentamicin: S <=1      -  Imipenem: S <=1      -  Levofloxacin: S <=1      -  Meropenem: S <=1      -  Piperacillin/Tazobactam: S <=8      -  Tobramycin: S <=2      -  Trimethoprim/Sulfamethoxazole: S <=0.5/9.5      Method Type: FRIDA            RADIOLOGY & ADDITIONAL TESTS:    ANTIBIOTICS:  ampicillin/sulbactam  IVPB      ampicillin/sulbactam  IVPB 3 Gram(s) IV Intermittent every 6 hours

## 2018-12-29 NOTE — PROGRESS NOTE ADULT - SUBJECTIVE AND OBJECTIVE BOX
CC: f/u anaphylaxis      INTERVAL EVENTS:  no interval red flags reproted  seen by ID and recs appreciated      ROS:        Vital Signs Last 24 Hrs  T(C): 36.2 (29 Dec 2018 14:00), Max: 36.3 (28 Dec 2018 22:18)  T(F): 97.1 (29 Dec 2018 14:00), Max: 97.3 (28 Dec 2018 22:18)  HR: 77 (29 Dec 2018 14:00) (76 - 79)  BP: 119/62 (29 Dec 2018 14:00) (116/72 - 132/78)  BP(mean): --  RR: 18 (29 Dec 2018 14:00) (16 - 20)  SpO2: 96% (29 Dec 2018 10:52) (96% - 96%)        PHYSICAL EXAM:  GENERAL: NAD, well-groomed, well-developed  HEAD = atraumatic, normoocehalic  EYES: conjunctiva and sclera clear  NECK: Supple, No JVD  NERVOUS SYSTEM:  Alert & Oriented X3, Good concentration. gait stable and indeopendent  CHEST/LUNG: Clear to ausculatation bilaterally excpet some coarse sounds over the RLL  HEART: Regular rate and rhythm; No murmurs, rubs, or gallops  ABDOMEN: Soft, Nontender  EXTREMITIES:  BL No clubbing, cyanosis, or edema        DATA:    no new labs today        Culture - Urine (collected 28 Dec 2018 01:15)  Source: .Urine Clean Catch (Midstream)  Final Report (29 Dec 2018 21:31):    No growth    Culture - Blood (collected 27 Dec 2018 16:00)  Source: .Blood None  Preliminary Report (29 Dec 2018 01:02):    No growth to date.    Culture - Sputum (collected 26 Dec 2018 12:55)  Source: .Sputum Sputum  Gram Stain (27 Dec 2018 06:57):    Few Squamous epithelial cells per low power field    Few polymorphonuclear leukocytes per low power field    Few Yeast like cells per oil power field    Numerous Gram Variable Rods per oil power field    Numerous Gram positive cocci in pairs, chains and clusters per oil power    field  Final Report (28 Dec 2018 17:09):    Moderate Enterobacter cloacae/asburiae    Numerous Staphylococcus aureus    Normal Respiratory Ewa present  Organism: Enterobacter cloacae/absuria  Staphylococcus aureus (28 Dec 2018 17:09)  Organism: Staphylococcus aureus (28 Dec 2018 17:09)      -  Ampicillin/Sulbactam: S <=8/4      -  Cefazolin: S <=4      -  Clindamycin: S 0.5      -  Erythromycin: S 0.5      -  Gentamicin: S <=1 Should not be used as monotherapy      -  Oxacillin: S <=0.25      -  Penicillin: R 4      -  RIF- Rifampin: S <=1 Should not be used as monotherapy      -  Tetra/Doxy: S <=1      -  Trimethoprim/Sulfamethoxazole: S <=0.5/9.5      -  Vancomycin: S 2      Method Type: FRIDA  Organism: Enterobacter cloacae/absuria (28 Dec 2018 17:09)      -  Amikacin: S <=8      -  Amoxicillin/Clavulanic Acid: R >16/8      -  Ampicillin: R >16 These ampicillin results predict results for amoxicillin      -  Ampicillin/Sulbactam: R 8/4      -  Aztreonam: S <=4      -  Cefazolin: R >16      -  Cefepime: S <=2      -  Cefoxitin: R >16      -  Ceftriaxone: S <=1 Enterobacter, Citrobacter, and Serratia may develop resistance during prolonged therapy      -  Ciprofloxacin: S <=0.5      -  Ertapenem: S <=0.5      -  Gentamicin: S <=1      -  Imipenem: S <=1      -  Levofloxacin: S <=1      -  Meropenem: S <=1      -  Piperacillin/Tazobactam: S <=8      -  Tobramycin: S <=2      -  Trimethoprim/Sulfamethoxazole: S <=0.5/9.5      Method Type: FRIDA CC: f/u anaphylaxis      INTERVAL EVENTS:  no interval red flags reproted  seen by ID and recs appreciated      ROS:  no complaints or pain  asked me re taking prednisone from pulmonolgist in future is ok. I advised to inform pulmonologist of situation here and that if in situation of using steroids for breathing comes up in future, I would imagine that her pulmonologist would be ok with po prednisone but that they would decide at the time and likely monitor the first dose  she also plans to followup with an allergist      Vital Signs Last 24 Hrs  T(C): 36.2 (29 Dec 2018 14:00), Max: 36.3 (28 Dec 2018 22:18)  T(F): 97.1 (29 Dec 2018 14:00), Max: 97.3 (28 Dec 2018 22:18)  HR: 77 (29 Dec 2018 14:00) (76 - 79)  BP: 119/62 (29 Dec 2018 14:00) (116/72 - 132/78)  BP(mean): --  RR: 18 (29 Dec 2018 14:00) (16 - 20)  SpO2: 96% (29 Dec 2018 10:52) (96% - 96%)        PHYSICAL EXAM:  GENERAL: NAD, well-groomed, well-developed  HEAD = atraumatic, normoocehalic  EYES: conjunctiva and sclera clear  NECK: Supple, No JVD  NERVOUS SYSTEM:  Alert & Oriented X3, Good concentration. gait stable and indeopendent  CHEST/LUNG: Clear to ausculatation bilaterally  HEART: Regular rate and rhythm; No murmurs, rubs, or gallops  ABDOMEN: Soft, Nontender  EXTREMITIES:  BL No clubbing, cyanosis, or edema        DATA:    no new labs today        Culture - Urine (collected 28 Dec 2018 01:15)  Source: .Urine Clean Catch (Midstream)  Final Report (29 Dec 2018 21:31):    No growth    Culture - Blood (collected 27 Dec 2018 16:00)  Source: .Blood None  Preliminary Report (29 Dec 2018 01:02):    No growth to date.    Culture - Sputum (collected 26 Dec 2018 12:55)  Source: .Sputum Sputum  Gram Stain (27 Dec 2018 06:57):    Few Squamous epithelial cells per low power field    Few polymorphonuclear leukocytes per low power field    Few Yeast like cells per oil power field    Numerous Gram Variable Rods per oil power field    Numerous Gram positive cocci in pairs, chains and clusters per oil power    field  Final Report (28 Dec 2018 17:09):    Moderate Enterobacter cloacae/asburiae    Numerous Staphylococcus aureus    Normal Respiratory Ewa present  Organism: Enterobacter cloacae/absuria  Staphylococcus aureus (28 Dec 2018 17:09)  Organism: Staphylococcus aureus (28 Dec 2018 17:09)      -  Ampicillin/Sulbactam: S <=8/4      -  Cefazolin: S <=4      -  Clindamycin: S 0.5      -  Erythromycin: S 0.5      -  Gentamicin: S <=1 Should not be used as monotherapy      -  Oxacillin: S <=0.25      -  Penicillin: R 4      -  RIF- Rifampin: S <=1 Should not be used as monotherapy      -  Tetra/Doxy: S <=1      -  Trimethoprim/Sulfamethoxazole: S <=0.5/9.5      -  Vancomycin: S 2      Method Type: FRIDA  Organism: Enterobacter cloacae/absuria (28 Dec 2018 17:09)      -  Amikacin: S <=8      -  Amoxicillin/Clavulanic Acid: R >16/8      -  Ampicillin: R >16 These ampicillin results predict results for amoxicillin      -  Ampicillin/Sulbactam: R 8/4      -  Aztreonam: S <=4      -  Cefazolin: R >16      -  Cefepime: S <=2      -  Cefoxitin: R >16      -  Ceftriaxone: S <=1 Enterobacter, Citrobacter, and Serratia may develop resistance during prolonged therapy      -  Ciprofloxacin: S <=0.5      -  Ertapenem: S <=0.5      -  Gentamicin: S <=1      -  Imipenem: S <=1      -  Levofloxacin: S <=1      -  Meropenem: S <=1      -  Piperacillin/Tazobactam: S <=8      -  Tobramycin: S <=2      -  Trimethoprim/Sulfamethoxazole: S <=0.5/9.5      Method Type: FRIDA

## 2018-12-29 NOTE — PROGRESS NOTE ADULT - ASSESSMENT
59 yo female with PMH that includes asthma / COPD, NARDA, who was admitted 12/26 for cardiac arrest and anaphylactic shock, suspected due to NSAID. also found with RLL opacity. she underwent reintubation and CPR with ROSC, sustaining sternal fracture in the setting of CPR. she was stabilized, extubated, and transferred to the wards. she was evaluated by CV post resuscitation and recs appreciated      # anaphylactic shock: now resolved  - cont 5 day course iv solumedrol due to prednisone allergy.   - Rx and teach epi pen  - advised nsaid can be under different names and in some otc HA / URI remedies. advised to remember NO NSAIDS for her MDs and her phamacists      # RLL PNA: suspected aspiration in the setting of intubation / CPR  - cont Unasyn 3 gm iv q6h  - Could change to po Augmentin 875 mg q12h for 7 days on discharge  - cont nebs / symbicort for COPD      # Cardiac arrest: likely due to hypoxia. now resolved. noted with sternal fracture likely CPR related. seen by cardiology  - needs f/u cardiology for ischemic eval  - cardio recommneds Outpatient dobutamine SE in 4-6 weeks      # Outpatient followup needed for:  - Hyperglycemia & Glucosuria: a1c ok.  - elevated alkphos, could be due to fracture      # Coordination of care:  - VTE ppx: on LMWH  - no neeed for daily labs  - anticipate home tomorrow after completion of steroid course

## 2018-12-29 NOTE — PROGRESS NOTE ADULT - REASON FOR ADMISSION
anaphylaxis, cardiac arrest

## 2018-12-30 ENCOUNTER — TRANSCRIPTION ENCOUNTER (OUTPATIENT)
Age: 58
End: 2018-12-30

## 2018-12-30 VITALS
TEMPERATURE: 96 F | DIASTOLIC BLOOD PRESSURE: 85 MMHG | HEART RATE: 80 BPM | RESPIRATION RATE: 16 BRPM | SYSTOLIC BLOOD PRESSURE: 140 MMHG

## 2018-12-30 RX ORDER — ALBUTEROL 90 UG/1
2 AEROSOL, METERED ORAL
Qty: 0 | Refills: 0 | COMMUNITY

## 2018-12-30 RX ORDER — EPINEPHRINE 0.3 MG/.3ML
0.3 INJECTION INTRAMUSCULAR; SUBCUTANEOUS
Qty: 1 | Refills: 1 | OUTPATIENT
Start: 2018-12-30

## 2018-12-30 RX ADMIN — BUDESONIDE AND FORMOTEROL FUMARATE DIHYDRATE 2 PUFF(S): 160; 4.5 AEROSOL RESPIRATORY (INHALATION) at 07:35

## 2018-12-30 RX ADMIN — AMPICILLIN SODIUM AND SULBACTAM SODIUM 200 GRAM(S): 250; 125 INJECTION, POWDER, FOR SUSPENSION INTRAMUSCULAR; INTRAVENOUS at 05:44

## 2018-12-30 RX ADMIN — AMPICILLIN SODIUM AND SULBACTAM SODIUM 200 GRAM(S): 250; 125 INJECTION, POWDER, FOR SUSPENSION INTRAMUSCULAR; INTRAVENOUS at 11:44

## 2018-12-30 RX ADMIN — PANTOPRAZOLE SODIUM 40 MILLIGRAM(S): 20 TABLET, DELAYED RELEASE ORAL at 06:01

## 2018-12-30 RX ADMIN — Medication 60 MILLIGRAM(S): at 05:45

## 2018-12-30 RX ADMIN — Medication 3 MILLILITER(S): at 07:35

## 2018-12-30 NOTE — DISCHARGE NOTE ADULT - PLAN OF CARE
asymptomatic cardiac stress test with cardiologist Dr De Leon in one month Avoid all NSAIDS  EpiPen  allergist follow up

## 2018-12-30 NOTE — DISCHARGE NOTE ADULT - PATIENT PORTAL LINK FT
You can access the [facility] Patient Portal, offered by [facility], by registering with the following website: [email or web address]

## 2018-12-30 NOTE — DISCHARGE NOTE ADULT - HOSPITAL COURSE
58 year old female was admitted s/p anaphylaxis to aleve and cardiac arrest s/p CPR and ACLS protocol and intubation. Pt had a 5 day course of steroids, extubated and complete resolution of symptoms.  Pt also had staph pneumonia. Today the pt is awake and alert denies any pain and is cleared by cardiology and ID for dc home. Pt was instructed to avoid all NSAIDS and to gee an EPIPEN and to follow up with allergy and cardiology as an out patient and to finish 7 more days of oral antibiotics.  Pt seen and evaluated by me today 40 min spent on dc

## 2018-12-30 NOTE — DISCHARGE NOTE ADULT - MEDICATION SUMMARY - MEDICATIONS TO TAKE
I will START or STAY ON the medications listed below when I get home from the hospital:    Breo Ellipta 100 mcg-25 mcg/inh inhalation powder  -- 1 puff(s) inhaled once a day (at bedtime)  -- Indication: For Asthma    EPINEPHrine 0.3 mg injectable kit  -- 0.3 milligram(s) intramuscularly once   -- Obtain medical advice before taking any non-prescription drugs as some may affect the action of this medication.    -- Indication: For Anaphalaxis    Singulair 10 mg oral tablet  -- 1 tab(s) by mouth once a day (at bedtime)  -- Indication: For Asthma    amoxicillin-clavulanate 875 mg-125 mg oral tablet  -- 1  by mouth 2 times a day   -- Finish all this medication unless otherwise directed by prescriber.  Take with food or milk.    -- Indication: For pneumonia

## 2018-12-30 NOTE — DISCHARGE NOTE ADULT - CARE PROVIDERS DIRECT ADDRESSES
[email or web address] [address].[email or web address].Replaced by [facility].[employer]

## 2018-12-30 NOTE — DISCHARGE NOTE ADULT - MEDICATION SUMMARY - MEDICATIONS TO STOP TAKING
I will STOP taking the medications listed below when I get home from the hospital:    oxyCODONE-acetaminophen 5 mg-325 mg oral tablet  -- 1 tab(s) by mouth every 6 hours MDD:take no more than 4 tabs max per day.  -- Caution federal law prohibits the transfer of this drug to any person other  than the person for whom it was prescribed.  May cause drowsiness.  Alcohol may intensify this effect.  Use care when operating dangerous machinery.  This prescription cannot be refilled.  This product contains acetaminophen.  Do not use  with any other product containing acetaminophen to prevent possible liver damage.  Using more of this medication than prescribed may cause serious breathing problems.    albuterol 90 mcg/inh inhalation aerosol  -- 2 puff(s) inhaled 4 times a day, As Needed    Anuel López

## 2018-12-30 NOTE — DISCHARGE NOTE ADULT - CARE PLAN
Principal Discharge DX:	Cardiopulmonary arrest with successful resuscitation  Goal:	asymptomatic  Assessment and plan of treatment:	cardiac stress test with cardiologist Dr De Leon in one month  Secondary Diagnosis:	Anaphylaxis  Goal:	asymptomatic  Assessment and plan of treatment:	Avoid all NSAIDS  EpiPen  allergist follow up  Secondary Diagnosis:	Sternal fracture

## 2018-12-30 NOTE — DISCHARGE NOTE ADULT - CARE PROVIDER_API CALL
Reji De Leon), Cardiovascular Disease; Internal Medicine  [address] [place] [address]  Phone: [phone]  Fax: [phone]    Zabrina Aldridge), Allergy and Immunology; Internal Medicine  [address] [place] [address]  Phone: [phone]  Fax: [phone]    Raymundo Alejandra), Critical Care Medicine; Pulmonary Disease; Sleep Medicine  [address] [place] [address]  Phone: [phone]  Fax: [phone]    Marija Caldwell (DO), Family Medicine  [address] [place] [address]  Phone: [phone]  Fax: [phone]

## 2019-01-02 LAB
CULTURE RESULTS: SIGNIFICANT CHANGE UP
SPECIMEN SOURCE: SIGNIFICANT CHANGE UP

## 2019-01-03 LAB
CULTURE RESULTS: SIGNIFICANT CHANGE UP
SPECIMEN SOURCE: SIGNIFICANT CHANGE UP
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
## 2019-01-04 DIAGNOSIS — T39.315A ADVERSE EFFECT OF PROPIONIC ACID DERIVATIVES, INITIAL ENCOUNTER: ICD-10-CM

## 2019-01-04 DIAGNOSIS — Z88.8 ALLERGY STATUS TO OTHER DRUGS, MEDICAMENTS AND BIOLOGICAL SUBSTANCES: ICD-10-CM

## 2019-01-04 DIAGNOSIS — J44.9 CHRONIC OBSTRUCTIVE PULMONARY DISEASE, UNSPECIFIED: ICD-10-CM

## 2019-01-04 DIAGNOSIS — J69.0 PNEUMONITIS DUE TO INHALATION OF FOOD AND VOMIT: ICD-10-CM

## 2019-01-04 DIAGNOSIS — Z87.891 PERSONAL HISTORY OF NICOTINE DEPENDENCE: ICD-10-CM

## 2019-01-04 DIAGNOSIS — G47.33 OBSTRUCTIVE SLEEP APNEA (ADULT) (PEDIATRIC): ICD-10-CM

## 2019-01-04 DIAGNOSIS — I46.9 CARDIAC ARREST, CAUSE UNSPECIFIED: ICD-10-CM

## 2019-01-04 DIAGNOSIS — R73.9 HYPERGLYCEMIA, UNSPECIFIED: ICD-10-CM

## 2019-01-04 DIAGNOSIS — S22.20XA UNSPECIFIED FRACTURE OF STERNUM, INITIAL ENCOUNTER FOR CLOSED FRACTURE: ICD-10-CM

## 2019-01-04 DIAGNOSIS — J96.01 ACUTE RESPIRATORY FAILURE WITH HYPOXIA: ICD-10-CM
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
## 2019-01-04 DIAGNOSIS — T88.6XXA ANAPHYLACTIC REACTION DUE TO ADVERSE EFFECT OF CORRECT DRUG OR MEDICAMENT PROPERLY ADMINISTERED, INITIAL ENCOUNTER: ICD-10-CM

## 2019-02-10 ENCOUNTER — INPATIENT (INPATIENT)
Facility: HOSPITAL | Age: 59
LOS: 4 days | Discharge: HOME | End: 2019-02-15
Attending: INTERNAL MEDICINE | Admitting: INTERNAL MEDICINE

## 2019-02-10 VITALS
WEIGHT: 190.04 LBS | SYSTOLIC BLOOD PRESSURE: 136 MMHG | RESPIRATION RATE: 22 BRPM | HEIGHT: 64 IN | HEART RATE: 112 BPM | TEMPERATURE: 97 F | OXYGEN SATURATION: 99 % | DIASTOLIC BLOOD PRESSURE: 91 MMHG

## 2019-02-10 DIAGNOSIS — J45.909 UNSPECIFIED ASTHMA, UNCOMPLICATED: ICD-10-CM

## 2019-02-10 DIAGNOSIS — G47.33 OBSTRUCTIVE SLEEP APNEA (ADULT) (PEDIATRIC): ICD-10-CM

## 2019-02-10 DIAGNOSIS — Z90.49 ACQUIRED ABSENCE OF OTHER SPECIFIED PARTS OF DIGESTIVE TRACT: Chronic | ICD-10-CM

## 2019-02-10 DIAGNOSIS — J33.9 NASAL POLYP, UNSPECIFIED: Chronic | ICD-10-CM

## 2019-02-10 LAB
ALBUMIN SERPL ELPH-MCNC: 4.6 G/DL — SIGNIFICANT CHANGE UP (ref 3.5–5.2)
ALP SERPL-CCNC: 139 U/L — HIGH (ref 30–115)
ALT FLD-CCNC: 25 U/L — SIGNIFICANT CHANGE UP (ref 0–41)
ANION GAP SERPL CALC-SCNC: 12 MMOL/L — SIGNIFICANT CHANGE UP (ref 7–14)
AST SERPL-CCNC: 22 U/L — SIGNIFICANT CHANGE UP (ref 0–41)
BASE EXCESS BLDA CALC-SCNC: -2.8 MMOL/L — LOW (ref -2–2)
BASE EXCESS BLDV CALC-SCNC: 0.7 MMOL/L — SIGNIFICANT CHANGE UP (ref -2–2)
BILIRUB SERPL-MCNC: 0.3 MG/DL — SIGNIFICANT CHANGE UP (ref 0.2–1.2)
BUN SERPL-MCNC: 17 MG/DL — SIGNIFICANT CHANGE UP (ref 10–20)
CA-I SERPL-SCNC: 1.21 MMOL/L — SIGNIFICANT CHANGE UP (ref 1.12–1.3)
CALCIUM SERPL-MCNC: 9.6 MG/DL — SIGNIFICANT CHANGE UP (ref 8.5–10.1)
CHLORIDE SERPL-SCNC: 98 MMOL/L — SIGNIFICANT CHANGE UP (ref 98–110)
CO2 SERPL-SCNC: 27 MMOL/L — SIGNIFICANT CHANGE UP (ref 17–32)
CREAT SERPL-MCNC: 0.7 MG/DL — SIGNIFICANT CHANGE UP (ref 0.7–1.5)
GAS PNL BLDV: 138 MMOL/L — SIGNIFICANT CHANGE UP (ref 136–145)
GAS PNL BLDV: SIGNIFICANT CHANGE UP
GLUCOSE SERPL-MCNC: 131 MG/DL — HIGH (ref 70–99)
HCO3 BLDA-SCNC: 26 MMOL/L — SIGNIFICANT CHANGE UP (ref 23–27)
HCO3 BLDV-SCNC: 28 MMOL/L — SIGNIFICANT CHANGE UP (ref 22–29)
HCT VFR BLD CALC: 43.1 % — SIGNIFICANT CHANGE UP (ref 37–47)
HCT VFR BLDA CALC: 74.4 % — HIGH (ref 34–44)
HCV AB S/CO SERPL IA: 0.08 S/CO — SIGNIFICANT CHANGE UP
HCV AB SERPL-IMP: SIGNIFICANT CHANGE UP
HGB BLD CALC-MCNC: 24.3 G/DL — CRITICAL HIGH (ref 14–18)
HGB BLD-MCNC: 14.2 G/DL — SIGNIFICANT CHANGE UP (ref 12–16)
HOROWITZ INDEX BLDA+IHG-RTO: 40 — SIGNIFICANT CHANGE UP
HOROWITZ INDEX BLDV+IHG-RTO: 21 — SIGNIFICANT CHANGE UP
LACTATE BLDV-MCNC: 1.8 MMOL/L — HIGH (ref 0.5–1.6)
MAGNESIUM SERPL-MCNC: 2.2 MG/DL — SIGNIFICANT CHANGE UP (ref 1.8–2.4)
MCHC RBC-ENTMCNC: 29.3 PG — SIGNIFICANT CHANGE UP (ref 27–31)
MCHC RBC-ENTMCNC: 32.9 G/DL — SIGNIFICANT CHANGE UP (ref 32–37)
MCV RBC AUTO: 88.9 FL — SIGNIFICANT CHANGE UP (ref 81–99)
NRBC # BLD: 0 /100 WBCS — SIGNIFICANT CHANGE UP (ref 0–0)
PCO2 BLDA: 58 MMHG — HIGH (ref 38–42)
PCO2 BLDV: 51 MMHG — SIGNIFICANT CHANGE UP (ref 41–51)
PH BLDA: 7.26 — LOW (ref 7.38–7.42)
PH BLDV: 7.35 — SIGNIFICANT CHANGE UP (ref 7.26–7.43)
PLATELET # BLD AUTO: 286 K/UL — SIGNIFICANT CHANGE UP (ref 130–400)
PO2 BLDA: 110 MMHG — HIGH (ref 78–95)
PO2 BLDV: 38 MMHG — SIGNIFICANT CHANGE UP (ref 20–40)
POTASSIUM BLDV-SCNC: 3.9 MMOL/L — SIGNIFICANT CHANGE UP (ref 3.3–5.6)
POTASSIUM SERPL-MCNC: 4.4 MMOL/L — SIGNIFICANT CHANGE UP (ref 3.5–5)
POTASSIUM SERPL-SCNC: 4.4 MMOL/L — SIGNIFICANT CHANGE UP (ref 3.5–5)
PROT SERPL-MCNC: 7.3 G/DL — SIGNIFICANT CHANGE UP (ref 6–8)
RBC # BLD: 4.85 M/UL — SIGNIFICANT CHANGE UP (ref 4.2–5.4)
RBC # FLD: 13.3 % — SIGNIFICANT CHANGE UP (ref 11.5–14.5)
SAO2 % BLDA: 97 % — SIGNIFICANT CHANGE UP (ref 94–98)
SAO2 % BLDV: 67 % — SIGNIFICANT CHANGE UP
SODIUM SERPL-SCNC: 137 MMOL/L — SIGNIFICANT CHANGE UP (ref 135–146)
TROPONIN T SERPL-MCNC: <0.01 NG/ML — SIGNIFICANT CHANGE UP
WBC # BLD: 8.24 K/UL — SIGNIFICANT CHANGE UP (ref 4.8–10.8)
WBC # FLD AUTO: 8.24 K/UL — SIGNIFICANT CHANGE UP (ref 4.8–10.8)

## 2019-02-10 RX ORDER — HEPARIN SODIUM 5000 [USP'U]/ML
5000 INJECTION INTRAVENOUS; SUBCUTANEOUS EVERY 8 HOURS
Qty: 0 | Refills: 0 | Status: DISCONTINUED | OUTPATIENT
Start: 2019-02-10 | End: 2019-02-15

## 2019-02-10 RX ORDER — IPRATROPIUM/ALBUTEROL SULFATE 18-103MCG
3 AEROSOL WITH ADAPTER (GRAM) INHALATION ONCE
Qty: 0 | Refills: 0 | Status: COMPLETED | OUTPATIENT
Start: 2019-02-10 | End: 2019-02-10

## 2019-02-10 RX ORDER — MONTELUKAST 4 MG/1
10 TABLET, CHEWABLE ORAL DAILY
Qty: 0 | Refills: 0 | Status: DISCONTINUED | OUTPATIENT
Start: 2019-02-10 | End: 2019-02-15

## 2019-02-10 RX ORDER — ALPRAZOLAM 0.25 MG
0.5 TABLET ORAL ONCE
Qty: 0 | Refills: 0 | Status: DISCONTINUED | OUTPATIENT
Start: 2019-02-10 | End: 2019-02-10

## 2019-02-10 RX ORDER — SODIUM CHLORIDE 9 MG/ML
1 INJECTION INTRAMUSCULAR; INTRAVENOUS; SUBCUTANEOUS ONCE
Qty: 0 | Refills: 0 | Status: COMPLETED | OUTPATIENT
Start: 2019-02-10 | End: 2019-02-10

## 2019-02-10 RX ORDER — BUDESONIDE AND FORMOTEROL FUMARATE DIHYDRATE 160; 4.5 UG/1; UG/1
2 AEROSOL RESPIRATORY (INHALATION)
Qty: 0 | Refills: 0 | Status: DISCONTINUED | OUTPATIENT
Start: 2019-02-10 | End: 2019-02-15

## 2019-02-10 RX ORDER — IPRATROPIUM/ALBUTEROL SULFATE 18-103MCG
3 AEROSOL WITH ADAPTER (GRAM) INHALATION EVERY 4 HOURS
Qty: 0 | Refills: 0 | Status: DISCONTINUED | OUTPATIENT
Start: 2019-02-10 | End: 2019-02-15

## 2019-02-10 RX ORDER — EPINEPHRINE 11.25MG/ML
0.5 SOLUTION, NON-ORAL INHALATION ONCE
Qty: 0 | Refills: 0 | Status: COMPLETED | OUTPATIENT
Start: 2019-02-10 | End: 2019-02-10

## 2019-02-10 RX ADMIN — HEPARIN SODIUM 5000 UNIT(S): 5000 INJECTION INTRAVENOUS; SUBCUTANEOUS at 13:45

## 2019-02-10 RX ADMIN — Medication 60 MILLIGRAM(S): at 21:09

## 2019-02-10 RX ADMIN — Medication 0.5 MILLILITER(S): at 08:45

## 2019-02-10 RX ADMIN — HEPARIN SODIUM 5000 UNIT(S): 5000 INJECTION INTRAVENOUS; SUBCUTANEOUS at 21:09

## 2019-02-10 RX ADMIN — Medication 1 MILLIGRAM(S): at 21:07

## 2019-02-10 RX ADMIN — Medication 3 MILLILITER(S): at 07:45

## 2019-02-10 RX ADMIN — BUDESONIDE AND FORMOTEROL FUMARATE DIHYDRATE 2 PUFF(S): 160; 4.5 AEROSOL RESPIRATORY (INHALATION) at 21:08

## 2019-02-10 RX ADMIN — Medication 3 MILLILITER(S): at 16:11

## 2019-02-10 RX ADMIN — SODIUM CHLORIDE 0.25 MILLILITER(S): 9 INJECTION INTRAMUSCULAR; INTRAVENOUS; SUBCUTANEOUS at 08:02

## 2019-02-10 RX ADMIN — Medication 1 MILLIGRAM(S): at 15:52

## 2019-02-10 RX ADMIN — Medication 0.5 MILLIGRAM(S): at 08:45

## 2019-02-10 RX ADMIN — Medication 3 MILLILITER(S): at 19:49

## 2019-02-10 RX ADMIN — Medication 60 MILLIGRAM(S): at 13:45

## 2019-02-10 RX ADMIN — Medication 125 MILLIGRAM(S): at 07:36

## 2019-02-10 NOTE — ED PROVIDER NOTE - NS ED ROS FT
Review of Systems    Constitutional: (-) fever, (-) chills  Eyes/ENT: (-) blurry vision, (-) epistaxis, (-) sore throat  Cardiovascular: (-) chest pain, (-) syncope  Respiratory: (+) cough, (+) shortness of breath  Gastrointestinal: (-) pain, (-) nausea, (-) vomiting, (-) diarrhea  Musculoskeletal: (-) neck pain, (-) back pain, (-) joint pain  Integumentary: (+) rash to legs from Prednisone,  (-) edema  Neurological: (-) headache, (-) altered mental status  Psychiatric: (-) hallucinations  Allergic/Immunologic: (-) pruritus

## 2019-02-10 NOTE — ED ADULT NURSE NOTE - NSIMPLEMENTINTERV_GEN_ALL_ED
Implemented All Universal Safety Interventions:  [place] to call system. Call bell, personal items and telephone within reach. Instruct patient to call for assistance. Room bathroom lighting operational. Non-slip footwear when patient is off stretcher. Physically safe environment: no spills, clutter or unnecessary equipment. Stretcher in lowest position, wheels locked, appropriate side rails in place.

## 2019-02-10 NOTE — ED PROVIDER NOTE - PHYSICAL EXAMINATION
Gen: Alert, tachypneic, well appearing  Head: NC, AT, PERRL, EOMI, normal lids/conjunctiva  ENT: normal hearing, patent oropharynx   Neck: +supple, no tenderness/meningismus,  Pulm: +wheezing b/l  CV: S1S2, tachy  Abd: soft, NT/ND  Mskel: no edema/erythema/cyanosis. No leg swelling or tenderness  Skin: +macular rash to legs (which she always gets from Prednisone),  warm/dry  Neuro: AAOx3, no sensory/motor deficits

## 2019-02-10 NOTE — CONSULT NOTE ADULT - SUBJECTIVE AND OBJECTIVE BOX
Patient is a 59y old  Female who presents with a chief complaint of sob/ wheezing - 2 days    T(F): 97.2 (02-10-19 @ 06:32), Max: 97.2 (02-10-19 @ 06:32)  HR: 105 (02-10-19 @ 08:07)  BP: 136/91 (02-10-19 @ 06:32)  RR: 22 (02-10-19 @ 06:32)  SpO2: 100% (02-10-19 @ 08:07) (99% - 100%)    PHYSICAL EXAM:  GENERAL: NAD, well-groomed, well-developed  HEAD:  Atraumatic, Normocephalic  EYES: EOMI, PERRLA, conjunctiva and sclera clear  ENMT: No tonsillar erythema, exudates, or enlargement; Moist mucous membranes, Good dentition, No lesions  NECK: Supple, No JVD, Normal thyroid  NERVOUS SYSTEM:  Alert & Oriented X3, Good concentration; Motor Strength 5/5 B/L upper and lower extremities; DTRs 2+ intact and symmetric  CHEST/LUNG: Clear to percussion bilaterally; khalida  rales, rhonchi, wheezing, or rubs  HEART: Regular rate and rhythm; No murmurs, rubs, or gallops  ABDOMEN: Soft, Nontender, Nondistended; Bowel sounds present  EXTREMITIES:  2+ Peripheral Pulses, No clubbing, cyanosis, or edema  LYMPH: No lymphadenopathy noted  SKIN: No rashes or lesions    labs                              14.2   8.24  )-----------( 286      ( 10 Feb 2019 08:03 )             43.1               radiology    ALBUTerol/ipratropium for Nebulization. 3 milliLiter(s) Nebulizer once  ALBUTerol/ipratropium for Nebulization. 3 milliLiter(s) Nebulizer once  LORazepam   Injectable 0.5 milliGRAM(s) IV Push Once  racepinephrine  2.25% Inhalation 0.5 milliLiter(s) Inhalation Once

## 2019-02-10 NOTE — ED PROVIDER NOTE - CARE PLAN
Principal Discharge DX:	Chronic obstructive pulmonary disease with acute exacerbation  Secondary Diagnosis:	Respiratory acidosis

## 2019-02-10 NOTE — H&P ADULT - HISTORY OF PRESENT ILLNESS
58F PMHx asthma/ COPD, NARDA, recent analphylatic shock with cardiac arrest here with shortness of breath. For the past 5 days pt has been having wheezing with sob. Called her pulmonologist who prescribed prednisone taper; she is s/p 3 days prednisone 40, on prednisone 20. She has noticed papular rash on R hand and L LE which she attributes to the prednisone. She has some improvement but continued to have sob at rest.     59 yo female with PMH that includes asthma / COPD, NARDA, who was admitted 12/26 for cardiac arrest and anaphylactic shock, suspected due to NSAID. also found with RLL opacity. she underwent reintubation and CPR with ROSC, sustaining sternal fracture in the setting of CPR. she was stabilized, extubated, and transferred to the wards. she was evaluated by CV post resuscitation and recs appreciated      # anaphylactic shock: now resolved  - cont 5 day course iv solumedrol due to prednisone allergy.   - Rx and teach epi pen  - advised nsaid can be under different names and in some otc HA / URI remedies. advised to remember NO NSAIDS for her MDs and her phamacists      # RLL PNA: suspected aspiration in the setting of intubation / CPR  - cont Unasyn 3 gm iv q6h  - Could change to po Augmentin 875 mg q12h for 7 days on discharge  - cont nebs / symbicort for COPD      # Cardiac arrest: likely due to hypoxia. now resolved. noted with sternal fracture likely CPR related. seen by cardiology  - needs f/u cardiology for ischemic eval  - cardio recommneds Outpatient dobutamine SE in 4-6 weeks      # Outpatient followup needed for:  - Hyperglycemia & Glucosuria: a1c ok.  - elevated alkphos, could be due to fracture      # Coordination of care:  - VTE ppx: on LMWH  - no neeed for daily labs  - anticipate home tomorrow after completion of steroid course 58F PMHx asthma/ COPD, NARDA, recent analphylatic shock with cardiac arrest here with shortness of breath. For the past 5 days pt has been having wheezing with sob. Called her pulmonologist who prescribed prednisone taper; she is s/p 3 days prednisone 40, on prednisone 20. She has noticed papular rash on R hand and L LE which she attributes to the prednisone. She has some improvement but continued to have sob at rest. No fever, chills, sick contact, recent travel. Notably she had anaphylaxis to alleve c/b cardiac arrest and intubation last admission in December 2018. Alleve is the only allergy she is aware of, other than prednisone which gives her rash.

## 2019-02-10 NOTE — H&P ADULT - ASSESSMENT
58F PMHx asthma/ COPD, NARDA, recent analphylatic shock with cardiac arrest here with asthma exacerbation.    1. Asthma exacerbation  unclear etiology, possible environmental trigger  signifcant wheeze on exam  start bipap  trend abg  solumedrol 60 tid  duoneb q4  symbicort  singulair  pulm consult  2. NARDA  bipap in place  3. Asthma/ COPD  as above  4. DVT ppx  subq hep

## 2019-02-10 NOTE — ED PROVIDER NOTE - PROGRESS NOTE DETAILS
Pt placed on BIPAP as she continued to remain the same. Will also give one treatment with racemic epi. Pt is doing well on BIPAP. 40% FIO2. 12\6. Spoke to Dr. Mazariegos to consideration for ICU. Requested ABG. Pt was having severe anxiety. Ativan 0.5 mg given and pt improved. Blood gas done. Dr. Mazariegos evaluated pt and feels she will be stable for the floor. HR is 108, decreased wheezing noted. Remains on BIPAP. Blood gas reviewed. Mild respiratory acidosis. Pt is awake and alert. Discussed findings with Dr. Mazariegos. Advised admission to the floor. Discussed with Dr. Kain Cantu. Accepts admission. Blood gas and previous history discussed. I will admit to floor tell. Overall pts heart rate is better. Wheezing is improve.

## 2019-02-10 NOTE — ED PROVIDER NOTE - ATTENDING CONTRIBUTION TO CARE
Pt has a history of asthma. Hx of intubation in the past. Anaphylaxis to naprosyn which caused cardiac arrest this past December. Also has a drug reaction pruritic rash to the lower extremities when she takes prednisone. Presents with three days of DEMPSEY and shortness of breath. No URI. Unknown trigger. On exam mild respiratory distress, S1s2 rrr, bilateral wheezing. NO edema. Will start with nebulizer. Pt has been on prednisone x3 day. Likely will need admission.

## 2019-02-10 NOTE — ED PROVIDER NOTE - MEDICAL DECISION MAKING DETAILS
Pt with a history of asthma  and COPD. Hx of anaphylatic shock recently causing cardiac arrest. Here with mild distress. Improved with neb treatment and BIPAP. Pt has allergy to prednisone. Tolerated solumedrol. Evaluated by ICU who felt pt is stable for the floor.

## 2019-02-10 NOTE — ED PROVIDER NOTE - OBJECTIVE STATEMENT
58 yo F hx of asthma/COPD c/o SOB x 4 days. Patient spoke with Pulmonologist Dr. Perez and was started on Prednisone 40mg x3 days and just decreased to 20mg today without improvement. +SOB on exertion, cough. No chest pains, dizzy, n/v, or leg pains/swelling. No recent travel.

## 2019-02-11 LAB — GAS PNL BLDA: SIGNIFICANT CHANGE UP

## 2019-02-11 RX ADMIN — Medication 3 MILLILITER(S): at 16:28

## 2019-02-11 RX ADMIN — Medication 3 MILLILITER(S): at 08:35

## 2019-02-11 RX ADMIN — Medication 3 MILLILITER(S): at 11:57

## 2019-02-11 RX ADMIN — BUDESONIDE AND FORMOTEROL FUMARATE DIHYDRATE 2 PUFF(S): 160; 4.5 AEROSOL RESPIRATORY (INHALATION) at 20:29

## 2019-02-11 RX ADMIN — HEPARIN SODIUM 5000 UNIT(S): 5000 INJECTION INTRAVENOUS; SUBCUTANEOUS at 13:13

## 2019-02-11 RX ADMIN — Medication 1 MILLIGRAM(S): at 05:05

## 2019-02-11 RX ADMIN — HEPARIN SODIUM 5000 UNIT(S): 5000 INJECTION INTRAVENOUS; SUBCUTANEOUS at 22:11

## 2019-02-11 RX ADMIN — Medication 3 MILLILITER(S): at 20:11

## 2019-02-11 RX ADMIN — Medication 60 MILLIGRAM(S): at 22:10

## 2019-02-11 RX ADMIN — Medication 60 MILLIGRAM(S): at 05:07

## 2019-02-11 RX ADMIN — Medication 3 MILLILITER(S): at 05:40

## 2019-02-11 RX ADMIN — HEPARIN SODIUM 5000 UNIT(S): 5000 INJECTION INTRAVENOUS; SUBCUTANEOUS at 05:06

## 2019-02-11 RX ADMIN — Medication 3 MILLILITER(S): at 01:00

## 2019-02-11 RX ADMIN — MONTELUKAST 10 MILLIGRAM(S): 4 TABLET, CHEWABLE ORAL at 12:51

## 2019-02-11 RX ADMIN — Medication 60 MILLIGRAM(S): at 13:12

## 2019-02-11 NOTE — CONSULT NOTE ADULT - ASSESSMENT
Impression  >Hypercapnic respiratory failure   >Uncontrolled Asthma/COPD with exacerbation   >NARDA untreated     Recommendations    Changed to AVAPS   ABG in 1 hour   Check flu/RSV1  Solumedrol 60 q 8  Duonebs q 4 plus prn   Azithromycin   Check ECHO  Upgrade to ICU   Lasix 40 x 1 IV   Low threshold for intubation. Seems like we are moving in this direction.   D/w family at length   DVT px   PPI     Thank you for the consult. Impression  >Hypercapnic respiratory failure   >Uncontrolled Asthma/COPD with exacerbation   >NARDA untreated     Recommendations    Changed to AVAPS   ABG in 1 hour   Check flu/RSV1  Solumedrol 60 q 8  Duonebs q 4 plus prn   Azithromycin   Check ECHO  Upgrade to ICU   Lasix 40 x 1 IV   Low threshold for intubation. Seems like we are moving in this direction.   D/w family at length   DVT px   PPI   check FLU   check RVP now     Thank you for the consult.

## 2019-02-11 NOTE — PROGRESS NOTE ADULT - SUBJECTIVE AND OBJECTIVE BOX
Patient is a 59y old  Female who presents with a chief complaint of shortness of breath (11 Feb 2019 09:50)      SUBJECTIVE / OVERNIGHT EVENTS:  no cp, abd pain, fever  +sob, no cough, orthopnea, pnd    MEDICATIONS  (STANDING):  ALBUTerol/ipratropium for Nebulization 3 milliLiter(s) Nebulizer every 4 hours  buDESOnide 160 MICROgram(s)/formoterol 4.5 MICROgram(s) Inhaler 2 Puff(s) Inhalation two times a day  heparin  Injectable 5000 Unit(s) SubCutaneous every 8 hours  methylPREDNISolone sodium succinate Injectable 60 milliGRAM(s) IV Push three times a day  montelukast 10 milliGRAM(s) Oral daily    MEDICATIONS  (PRN):        CAPILLARY BLOOD GLUCOSE        I&O's Summary      PHYSICAL EXAM:  GENERAL: severe resp distress, a+o x3  EYES:  NECK:   CHEST/LUNG: decreased bs, resp distress  HEART: rrr no m/g/r  ABDOMEN: soft nt nd  EXTREMITIES:    PSYCH:   NEUROLOGY:   SKIN:    LABS:                        14.2   8.24  )-----------( 286      ( 10 Feb 2019 08:03 )             43.1     02-10    137  |  98  |  17  ----------------------------<  131<H>  4.4   |  27  |  0.7    Ca    9.6      10 Feb 2019 08:03  Mg     2.2     02-10    TPro  7.3  /  Alb  4.6  /  TBili  0.3  /  DBili  x   /  AST  22  /  ALT  25  /  AlkPhos  139<H>  02-10      CARDIAC MARKERS ( 10 Feb 2019 08:03 )  x     / <0.01 ng/mL / x     / x     / x              RADIOLOGY & ADDITIONAL TESTS:    Imaging Personally Reviewed:  Yes    Consultant(s) Notes Reviewed:      Care Discussed with Consultants/Other Providers:    Assessment and Plan   PAST MEDICAL & SURGICAL HISTORY:  NARDA (obstructive sleep apnea): DENIES CPAP OR BIPAP (SEE PULMONARY EVAL FOR POST-OP RECOMMENDATIONS)  Dyspnea: W/ EXERTION  COPD (chronic obstructive pulmonary disease)  Asthma: LAST ATTACK 2/18, WAS HOSPITALIZED  History of cholecystectomy  Nasal polyps: REMOVAL OF NASAL POLYPS 2015

## 2019-02-11 NOTE — PROGRESS NOTE ADULT - ASSESSMENT
58F PMHx asthma/ COPD, NARDA, recent analphylatic shock with cardiac arrest here with asthma exacerbation.    1. Asthma exacerbation  worsening resp status despite bipap, d/w pulm, plan to send to ICU  may need intubation  cont BIPAP continuous  solumedrol 60 tid  duoneb q4  symbicort  singulair  2. NARDA  bipap in place  3. Asthma/ COPD  as above  4. DVT ppx  subq hep 58F PMHx asthma/ COPD, NARDA, recent analphylatic shock with cardiac arrest here with asthma exacerbation.    1. Asthma exacerbation  worsening resp status despite bipap, d/w pulm, plan to send to ICU  may need intubation  cont BIPAP continuous  check rvp  solumedrol 60 tid  duoneb q4  symbicort  singulair  2. NARDA  bipap in place  3. Asthma/ COPD  as above  4. DVT ppx  subq hep

## 2019-02-11 NOTE — CHART NOTE - NSCHARTNOTEFT_GEN_A_CORE
Blood pressure is high but patient seems anxious (seen at bedside). Will give another dose of ativan which was temporarily effective

## 2019-02-11 NOTE — CONSULT NOTE ADULT - SUBJECTIVE AND OBJECTIVE BOX
Patient is a 59y old  Female who presents with a chief complaint of shortness of breath (10 Feb 2019 11:34)      HPI:  58F PMHx asthma/ COPD, NARDA, recent analphylatic shock with cardiac arrest here with shortness of breath. For the past 5 days pt has been having wheezing with sob. Called her pulmonologist who prescribed prednisone taper; she is s/p 3 days prednisone 40, on prednisone 20. She has noticed papular rash on R hand and L LE which she attributes to the prednisone. She has some improvement but continued to have sob at rest. No fever, chills, sick contact, recent travel. Notably she had anaphylaxis to alleve c/b cardiac arrest and intubation last admission in December 2018. Alleve is the only allergy she is aware of, other than prednisone which gives her rash. (10 Feb 2019 11:34)    This am, pt is drowsy, on the Bipap with low tidal volumes. ABG from yesterday was hypercapnic.   History obtained from family and office notes. A week ptp pt was sick with runny nose, sore throat. Pt was seen by Dr. medrano who started her on predinsone (which gave zaid rash but she continued taking). At baseline she is uncontrolled ACO on ICS/LAMA/LABA with frequent exacerbations needing steroids. Also is NARDA untreated.       PAST MEDICAL & SURGICAL HISTORY:  NARDA (obstructive sleep apnea): DENIES CPAP OR BIPAP (SEE PULMONARY EVAL FOR POST-OP RECOMMENDATIONS)  Dyspnea: W/ EXERTION  COPD (chronic obstructive pulmonary disease)  Asthma: LAST ATTACK 2/18, WAS HOSPITALIZED  History of cholecystectomy  Nasal polyps: REMOVAL OF NASAL POLYPS 2015      SOCIAL HX:   Smoking       quit smoking      ETOH           -ve              Other    FAMILY HISTORY:  Family history of breast cancer (Mother)  CVA (cerebral vascular accident) (Father)  .  No cardiovascular or pulmonary family history     Review of System:  See HPI    Allergies    Aleve (Anaphylaxis)  ibuprofen (Rash)  NSAIDs (Unknown)  predniSONE (Rash)    Intolerances          PHYSICAL EXAM  Vital Signs Last 24 Hrs  T(C): 35.8 (11 Feb 2019 05:21), Max: 36.7 (10 Feb 2019 11:00)  T(F): 96.5 (11 Feb 2019 05:21), Max: 98.1 (10 Feb 2019 11:00)  HR: 123 (11 Feb 2019 05:42) (96 - 125)  BP: 141/91 (11 Feb 2019 05:42) (139/70 - 193/104)  BP(mean): --  RR: 20 (11 Feb 2019 05:42) (20 - 20)  SpO2: 98% (10 Feb 2019 13:12) (98% - 98%)    General: NAD drowsy, responds to verbal stimuli.   HEENT: MOUNA        on Biapap  Lymphatic system: No cervical LN     Lungs: Reduced breath sounds bilateral.   Cardiovascular: Regular  Gastrointestinal: Soft.  + BS   Musculoskeletal: No Clubbing.  Full range of motion.. Moves all extremities  Skin: Warm.  Intact  Neurological: No motor or sensory deficit       LABS:                          14.2   8.24  )-----------( 286      ( 10 Feb 2019 08:03 )             43.1                                               02-10    137  |  98  |  17  ----------------------------<  131<H>  4.4   |  27  |  0.7    Ca    9.6      10 Feb 2019 08:03  Mg     2.2     02-10    TPro  7.3  /  Alb  4.6  /  TBili  0.3  /  DBili  x   /  AST  22  /  ALT  25  /  AlkPhos  139<H>  02-10                                                 CARDIAC MARKERS ( 10 Feb 2019 08:03 )  x     / <0.01 ng/mL / x     / x     / x                                                LIVER FUNCTIONS - ( 10 Feb 2019 08:03 )  Alb: 4.6 g/dL / Pro: 7.3 g/dL / ALK PHOS: 139 U/L / ALT: 25 U/L / AST: 22 U/L / GGT: x                                                                                            ABG - ( 10 Feb 2019 09:23 )  pH, Arterial: 7.26  pH, Blood: x     /  pCO2: 58    /  pO2: 110   / HCO3: 26    / Base Excess: -2.8  /  SaO2: 97                  MEDICATIONS  (STANDING):  ALBUTerol/ipratropium for Nebulization 3 milliLiter(s) Nebulizer every 4 hours  buDESOnide 160 MICROgram(s)/formoterol 4.5 MICROgram(s) Inhaler 2 Puff(s) Inhalation two times a day  heparin  Injectable 5000 Unit(s) SubCutaneous every 8 hours  methylPREDNISolone sodium succinate Injectable 60 milliGRAM(s) IV Push three times a day  montelukast 10 milliGRAM(s) Oral daily    MEDICATIONS  (PRN):      CXR : BilaTERAL patchy opacities.

## 2019-02-12 DIAGNOSIS — J96.01 ACUTE RESPIRATORY FAILURE WITH HYPOXIA: ICD-10-CM

## 2019-02-12 DIAGNOSIS — J44.1 CHRONIC OBSTRUCTIVE PULMONARY DISEASE WITH (ACUTE) EXACERBATION: ICD-10-CM

## 2019-02-12 DIAGNOSIS — B97.4 RESPIRATORY SYNCYTIAL VIRUS AS THE CAUSE OF DISEASES CLASSIFIED ELSEWHERE: ICD-10-CM

## 2019-02-12 LAB
ANION GAP SERPL CALC-SCNC: 10 MMOL/L — SIGNIFICANT CHANGE UP (ref 7–14)
BASE EXCESS BLDA CALC-SCNC: 7.6 MMOL/L — HIGH (ref -2–2)
BUN SERPL-MCNC: 30 MG/DL — HIGH (ref 10–20)
CALCIUM SERPL-MCNC: 9.3 MG/DL — SIGNIFICANT CHANGE UP (ref 8.5–10.1)
CHLORIDE SERPL-SCNC: 100 MMOL/L — SIGNIFICANT CHANGE UP (ref 98–110)
CO2 SERPL-SCNC: 34 MMOL/L — HIGH (ref 17–32)
CREAT SERPL-MCNC: 0.8 MG/DL — SIGNIFICANT CHANGE UP (ref 0.7–1.5)
GLUCOSE SERPL-MCNC: 122 MG/DL — HIGH (ref 70–99)
HCO3 BLDA-SCNC: 33 MMOL/L — HIGH (ref 23–27)
HCT VFR BLD CALC: 44.2 % — SIGNIFICANT CHANGE UP (ref 37–47)
HGB BLD-MCNC: 13.8 G/DL — SIGNIFICANT CHANGE UP (ref 12–16)
HOROWITZ INDEX BLDA+IHG-RTO: 35 — SIGNIFICANT CHANGE UP
MCHC RBC-ENTMCNC: 28.9 PG — SIGNIFICANT CHANGE UP (ref 27–31)
MCHC RBC-ENTMCNC: 31.2 G/DL — LOW (ref 32–37)
MCV RBC AUTO: 92.7 FL — SIGNIFICANT CHANGE UP (ref 81–99)
NRBC # BLD: 0 /100 WBCS — SIGNIFICANT CHANGE UP (ref 0–0)
PCO2 BLDA: 49 MMHG — HIGH (ref 38–42)
PH BLDA: 7.44 — HIGH (ref 7.38–7.42)
PLATELET # BLD AUTO: 295 K/UL — SIGNIFICANT CHANGE UP (ref 130–400)
PO2 BLDA: 69 MMHG — LOW (ref 78–95)
POTASSIUM SERPL-MCNC: 5.2 MMOL/L — HIGH (ref 3.5–5)
POTASSIUM SERPL-SCNC: 5.2 MMOL/L — HIGH (ref 3.5–5)
RAPID RVP RESULT: DETECTED
RBC # BLD: 4.77 M/UL — SIGNIFICANT CHANGE UP (ref 4.2–5.4)
RBC # FLD: 13.5 % — SIGNIFICANT CHANGE UP (ref 11.5–14.5)
RSV RNA SPEC QL NAA+PROBE: DETECTED
SAO2 % BLDA: 95 % — SIGNIFICANT CHANGE UP (ref 94–98)
SODIUM SERPL-SCNC: 144 MMOL/L — SIGNIFICANT CHANGE UP (ref 135–146)
WBC # BLD: 6.82 K/UL — SIGNIFICANT CHANGE UP (ref 4.8–10.8)
WBC # FLD AUTO: 6.82 K/UL — SIGNIFICANT CHANGE UP (ref 4.8–10.8)

## 2019-02-12 RX ADMIN — Medication 3 MILLILITER(S): at 12:36

## 2019-02-12 RX ADMIN — Medication 3 MILLILITER(S): at 00:47

## 2019-02-12 RX ADMIN — BUDESONIDE AND FORMOTEROL FUMARATE DIHYDRATE 2 PUFF(S): 160; 4.5 AEROSOL RESPIRATORY (INHALATION) at 09:30

## 2019-02-12 RX ADMIN — MONTELUKAST 10 MILLIGRAM(S): 4 TABLET, CHEWABLE ORAL at 12:38

## 2019-02-12 RX ADMIN — Medication 3 MILLILITER(S): at 08:05

## 2019-02-12 RX ADMIN — Medication 3 MILLILITER(S): at 16:34

## 2019-02-12 RX ADMIN — HEPARIN SODIUM 5000 UNIT(S): 5000 INJECTION INTRAVENOUS; SUBCUTANEOUS at 15:26

## 2019-02-12 RX ADMIN — Medication 60 MILLIGRAM(S): at 22:41

## 2019-02-12 RX ADMIN — Medication 3 MILLILITER(S): at 05:04

## 2019-02-12 RX ADMIN — BUDESONIDE AND FORMOTEROL FUMARATE DIHYDRATE 2 PUFF(S): 160; 4.5 AEROSOL RESPIRATORY (INHALATION) at 20:05

## 2019-02-12 RX ADMIN — HEPARIN SODIUM 5000 UNIT(S): 5000 INJECTION INTRAVENOUS; SUBCUTANEOUS at 05:49

## 2019-02-12 RX ADMIN — Medication 3 MILLILITER(S): at 20:04

## 2019-02-12 RX ADMIN — Medication 60 MILLIGRAM(S): at 05:49

## 2019-02-12 RX ADMIN — Medication 60 MILLIGRAM(S): at 15:26

## 2019-02-12 RX ADMIN — Medication 3 MILLILITER(S): at 23:58

## 2019-02-12 NOTE — PROGRESS NOTE ADULT - ASSESSMENT
1. Asthma/COPD exacerbation      RSV +      Improved with AVAPS and IV steroids             4. DVT ppx  subq hep 1. Asthma/COPD exacerbation      RSV +      Improved with AVAPS and IV steroids     -->NIV alternating with Nasal canula  -->Stable to transfer to medical floor     DVT ppx  subq hep

## 2019-02-12 NOTE — PROGRESS NOTE ADULT - SUBJECTIVE AND OBJECTIVE BOX
Patient is a 59y old  Female who presents with a chief complaint of shortness of breath (11 Feb 2019 12:32)        Over Night Events:        ROS:  See HPI    PHYSICAL EXAM    ICU Vital Signs Last 24 Hrs  T(C): 36.4 (12 Feb 2019 07:49), Max: 37.1 (11 Feb 2019 13:45)  T(F): 97.6 (12 Feb 2019 07:49), Max: 98.7 (11 Feb 2019 13:45)  HR: 87 (12 Feb 2019 08:06) (87 - 113)  BP: 150/78 (12 Feb 2019 05:00) (122/65 - 166/103)  BP(mean): 102 (12 Feb 2019 05:00) (85 - 128)  ABP: --  ABP(mean): --  RR: 19 (12 Feb 2019 05:04) (18 - 42)  SpO2: 95% (12 Feb 2019 08:06) (94% - 97%)      General:  HEENT: MOUNA             Lymphatic system: No cervical LN   Lungs: Bilateral BS  Cardiovascular: Regular   Gastrointestinal: Soft, Positive BS  Extremities: No clubbing.  Moves extremities.  Full Range of motion   Skin: Warm, intact  Neurological: No motor or sensory deficit       02-11-19 @ 07:01  -  02-12-19 @ 07:00  --------------------------------------------------------  IN:  Total IN: 0 mL    OUT:    Voided: 450 mL  Total OUT: 450 mL    Total NET: -450 mL          LABS:                            13.8   6.82  )-----------( 295      ( 12 Feb 2019 06:31 )             44.2                                               02-12    144  |  100  |  30<H>  ----------------------------<  122<H>  5.2<H>   |  34<H>  |  0.8    Ca    9.3      12 Feb 2019 06:31                                                                                                                                                                                                                       ABG - ( 12 Feb 2019 05:36 )  pH, Arterial: 7.44  pH, Blood: x     /  pCO2: 49    /  pO2: 69    / HCO3: 33    / Base Excess: 7.6   /  SaO2: 95                  MEDICATIONS  (STANDING):  ALBUTerol/ipratropium for Nebulization 3 milliLiter(s) Nebulizer every 4 hours  buDESOnide 160 MICROgram(s)/formoterol 4.5 MICROgram(s) Inhaler 2 Puff(s) Inhalation two times a day  heparin  Injectable 5000 Unit(s) SubCutaneous every 8 hours  methylPREDNISolone sodium succinate Injectable 60 milliGRAM(s) IV Push three times a day  montelukast 10 milliGRAM(s) Oral daily    MEDICATIONS  (PRN):      Xrays:                                                                                     ECHO Patient is a 59y old  Female who presents with a chief complaint of shortness of breath (11 Feb 2019 12:32)    Over Night Events:    no events   Was on NIV all night  Looks more awake, Doing better     ROS:  See HPI    PHYSICAL EXAM    ICU Vital Signs Last 24 Hrs  T(C): 36.4 (12 Feb 2019 07:49), Max: 37.1 (11 Feb 2019 13:45)  T(F): 97.6 (12 Feb 2019 07:49), Max: 98.7 (11 Feb 2019 13:45)  HR: 87 (12 Feb 2019 08:06) (87 - 113)  BP: 150/78 (12 Feb 2019 05:00) (122/65 - 166/103)  BP(mean): 102 (12 Feb 2019 05:00) (85 - 128)  RR: 19 (12 Feb 2019 05:04) (18 - 42)  SpO2: 95% (12 Feb 2019 08:06) (94% - 97%)      General: AAO x 3   HEENT: MOUNA             Lymphatic system: No cervical LN   Lungs: Bilateral BS, better wheeze   Cardiovascular: Regular   Gastrointestinal: Soft, Positive BS  Extremities: No clubbing.  Moves extremities.  Full Range of motion   Skin: Warm, intact  Neurological: No motor or sensory deficit       02-11-19 @ 07:01  -  02-12-19 @ 07:00  --------------------------------------------------------  IN:  Total IN: 0 mL    OUT:    Voided: 450 mL  Total OUT: 450 mL    Total NET: -450 mL          LABS:                            13.8   6.82  )-----------( 295      ( 12 Feb 2019 06:31 )             44.2                                               02-12    144  |  100  |  30<H>  ----------------------------<  122<H>  5.2<H>   |  34<H>  |  0.8    Ca    9.3      12 Feb 2019 06:31                                                                                 ABG - ( 12 Feb 2019 05:36 )  pH, Arterial: 7.44  pH, Blood: x     /  pCO2: 49    /  pO2: 69    / HCO3: 33    / Base Excess: 7.6   /  SaO2: 95        MEDICATIONS  (STANDING):  ALBUTerol/ipratropium for Nebulization 3 milliLiter(s) Nebulizer every 4 hours  buDESOnide 160 MICROgram(s)/formoterol 4.5 MICROgram(s) Inhaler 2 Puff(s) Inhalation two times a day  heparin  Injectable 5000 Unit(s) SubCutaneous every 8 hours  methylPREDNISolone sodium succinate Injectable 60 milliGRAM(s) IV Push three times a day  montelukast 10 milliGRAM(s) Oral daily    MEDICATIONS  (PRN):       Xrays:     bilateral lower lobe interstitial opacities                                                                                 ECHO

## 2019-02-12 NOTE — PROGRESS NOTE ADULT - ASSESSMENT
Impression  >Hypercapnic respiratory failure   >Uncontrolled Asthma/COPD with exacerbation   >NARDA untreated     Recommendations    Changed to AVAPS   ABG in 1 hour   Check flu/RSV1  Solumedrol 60 q 8  Duonebs q 4 plus prn   Azithromycin   Check ECHO  Upgrade to ICU   Lasix 40 x 1 IV   Low threshold for intubation. Seems like we are moving in this direction.   D/w family at length   DVT px   PPI   check FLU   check RVP now     Thank you for the consult. Impression  >Hypercapnic respiratory failure   >Uncontrolled Asthma/COPD with exacerbation   >NARDA untreated   >RSV     Recommendations    AVAPS 4 on 4 off + HS   Solumedrol 60 q 8  Duonebs q 4 plus prn   Azithromycin   Oral feeds  Keep satrs >88- 92%   DVT px   PPI   Downgrade to medical floor.

## 2019-02-12 NOTE — PROGRESS NOTE ADULT - SUBJECTIVE AND OBJECTIVE BOX
Patient is a 59y old  Female who presents with a chief complaint of shortness of breath (12 Feb 2019 08:53)  chart reviewed, pt seen and evaluated, pt reports feeling less sob but she is still wheezing       T(F): 97.6 (02-12-19 @ 07:49), Max: 98.7 (02-11-19 @ 13:45)  HR: 87 (02-12-19 @ 08:06)  BP: 150/78 (02-12-19 @ 05:00)  RR: 22 (02-12-19 @ 09:00)  SpO2: 95% (02-12-19 @ 08:06) (94% - 97%)    PHYSICAL EXAM:  GENERAL: NAD  HEAD:  Atraumatic, Normocephalic  NERVOUS SYSTEM:  Alert & Oriented X3, no focal deficits  CHEST/LUNG:  bilateral wheezing  HEART: Regular rate and rhythm; No murmurs, rubs, or gallops  ABDOMEN: Soft, Nontender, Nondistended; Bowel sounds present  EXTREMITIES:  2+ Peripheral Pulses, No clubbing, cyanosis, or edema  LYMPH: No lymphadenopathy noted  SKIN: No rashes or lesions    LABS  02-12    144  |  100  |  30<H>  ----------------------------<  122<H>  5.2<H>   |  34<H>  |  0.8    Ca    9.3      12 Feb 2019 06:31                            13.8   6.82  )-----------( 295      ( 12 Feb 2019 06:31 )             44.2     Troponin T, Serum: <0.01 ng/mL (02-10-19 @ 08:03)      RADIOLOGY  < from: Xray Chest 1 View- PORTABLE-Urgent (02.10.19 @ 08:28) >  Impression:      No radiographic evidence of acute cardiopulmonary disease.    < end of copied text >    MEDICATIONS  (STANDING):  ALBUTerol/ipratropium for Nebulization 3 milliLiter(s) Nebulizer every 4 hours  buDESOnide 160 MICROgram(s)/formoterol 4.5 MICROgram(s) Inhaler 2 Puff(s) Inhalation two times a day  heparin  Injectable 5000 Unit(s) SubCutaneous every 8 hours  methylPREDNISolone sodium succinate Injectable 60 milliGRAM(s) IV Push three times a day  montelukast 10 milliGRAM(s) Oral daily    MEDICATIONS  (PRN):

## 2019-02-12 NOTE — PROGRESS NOTE ADULT - SUBJECTIVE AND OBJECTIVE BOX
PETROS CARRANZA    Patient is a 59y old  Female who presents with a chief complaint of shortness of breath (12 Feb 2019 08:43)      Interval History/Overnight events:    tolerated AVAPS    T(C): 36.4 (02-12-19 @ 07:49), Max: 37.1 (02-11-19 @ 13:45)  HR: 87 (02-12-19 @ 08:06)  BP: 150/78 (02-12-19 @ 05:00)  RR: 19 (02-12-19 @ 05:04)  SpO2: 95% (02-12-19 @ 08:06)    PHYSICAL EXAM:    GENERAL:  not in acute distress  HEENT: Anicteric sclera, EOMI  CHEST/LUNG: Clear to auscultation bilaterally but decreased BS  HEART: Regular rate and rhythm; No murmurs, rubs, or gallops  ABDOMEN: Soft, Nontender, Nondistended; Bowel sounds present  EXTREMITIES: No clubbing, cyanosis, or edema      LABS:                          13.8   6.82  )-----------( 295      ( 12 Feb 2019 06:31 )             44.2     02-12    144  |  100  |  30<H>  ----------------------------<  122<H>  5.2<H>   |  34<H>  |  0.8    Ca    9.3      12 Feb 2019 06:31                    MEDICATIONS:    MEDICATIONS  (STANDING):  ALBUTerol/ipratropium for Nebulization 3 milliLiter(s) Nebulizer every 4 hours  buDESOnide 160 MICROgram(s)/formoterol 4.5 MICROgram(s) Inhaler 2 Puff(s) Inhalation two times a day  heparin  Injectable 5000 Unit(s) SubCutaneous every 8 hours  methylPREDNISolone sodium succinate Injectable 60 milliGRAM(s) IV Push three times a day  montelukast 10 milliGRAM(s) Oral daily      MEDICATIONS  (PRN):

## 2019-02-13 LAB
ANION GAP SERPL CALC-SCNC: 11 MMOL/L — SIGNIFICANT CHANGE UP (ref 7–14)
BUN SERPL-MCNC: 26 MG/DL — HIGH (ref 10–20)
CALCIUM SERPL-MCNC: 9.1 MG/DL — SIGNIFICANT CHANGE UP (ref 8.5–10.1)
CHLORIDE SERPL-SCNC: 99 MMOL/L — SIGNIFICANT CHANGE UP (ref 98–110)
CO2 SERPL-SCNC: 32 MMOL/L — SIGNIFICANT CHANGE UP (ref 17–32)
CREAT SERPL-MCNC: 0.7 MG/DL — SIGNIFICANT CHANGE UP (ref 0.7–1.5)
GLUCOSE SERPL-MCNC: 133 MG/DL — HIGH (ref 70–99)
HCT VFR BLD CALC: 44.6 % — SIGNIFICANT CHANGE UP (ref 37–47)
HGB BLD-MCNC: 14 G/DL — SIGNIFICANT CHANGE UP (ref 12–16)
MCHC RBC-ENTMCNC: 28.6 PG — SIGNIFICANT CHANGE UP (ref 27–31)
MCHC RBC-ENTMCNC: 31.4 G/DL — LOW (ref 32–37)
MCV RBC AUTO: 91.2 FL — SIGNIFICANT CHANGE UP (ref 81–99)
NRBC # BLD: 0 /100 WBCS — SIGNIFICANT CHANGE UP (ref 0–0)
PLATELET # BLD AUTO: 288 K/UL — SIGNIFICANT CHANGE UP (ref 130–400)
POTASSIUM SERPL-MCNC: 4.8 MMOL/L — SIGNIFICANT CHANGE UP (ref 3.5–5)
POTASSIUM SERPL-SCNC: 4.8 MMOL/L — SIGNIFICANT CHANGE UP (ref 3.5–5)
RBC # BLD: 4.89 M/UL — SIGNIFICANT CHANGE UP (ref 4.2–5.4)
RBC # FLD: 13.3 % — SIGNIFICANT CHANGE UP (ref 11.5–14.5)
SODIUM SERPL-SCNC: 142 MMOL/L — SIGNIFICANT CHANGE UP (ref 135–146)
WBC # BLD: 5.93 K/UL — SIGNIFICANT CHANGE UP (ref 4.8–10.8)
WBC # FLD AUTO: 5.93 K/UL — SIGNIFICANT CHANGE UP (ref 4.8–10.8)

## 2019-02-13 RX ORDER — PANTOPRAZOLE SODIUM 20 MG/1
40 TABLET, DELAYED RELEASE ORAL
Qty: 0 | Refills: 0 | Status: DISCONTINUED | OUTPATIENT
Start: 2019-02-13 | End: 2019-02-15

## 2019-02-13 RX ADMIN — HEPARIN SODIUM 5000 UNIT(S): 5000 INJECTION INTRAVENOUS; SUBCUTANEOUS at 21:11

## 2019-02-13 RX ADMIN — HEPARIN SODIUM 5000 UNIT(S): 5000 INJECTION INTRAVENOUS; SUBCUTANEOUS at 13:14

## 2019-02-13 RX ADMIN — HEPARIN SODIUM 5000 UNIT(S): 5000 INJECTION INTRAVENOUS; SUBCUTANEOUS at 05:36

## 2019-02-13 RX ADMIN — Medication 3 MILLILITER(S): at 11:28

## 2019-02-13 RX ADMIN — Medication 3 MILLILITER(S): at 15:41

## 2019-02-13 RX ADMIN — MONTELUKAST 10 MILLIGRAM(S): 4 TABLET, CHEWABLE ORAL at 10:37

## 2019-02-13 RX ADMIN — Medication 3 MILLILITER(S): at 07:43

## 2019-02-13 RX ADMIN — BUDESONIDE AND FORMOTEROL FUMARATE DIHYDRATE 2 PUFF(S): 160; 4.5 AEROSOL RESPIRATORY (INHALATION) at 07:48

## 2019-02-13 RX ADMIN — Medication 40 MILLIGRAM(S): at 19:15

## 2019-02-13 RX ADMIN — Medication 60 MILLIGRAM(S): at 13:13

## 2019-02-13 RX ADMIN — Medication 3 MILLILITER(S): at 19:22

## 2019-02-13 RX ADMIN — Medication 60 MILLIGRAM(S): at 05:35

## 2019-02-13 NOTE — PROGRESS NOTE ADULT - SUBJECTIVE AND OBJECTIVE BOX
Patient is a 59y old  Female who presents with a chief complaint of shortness of breath (12 Feb 2019 08:53)    -pt seen and examined at bedside, CCU downgrade, used AVAP overnight, on nasa cannula O2, comfortable and speaking in full sentences  -continue with IV steroids, nebx, abx  -Pulmonary follow up noted     Vital Signs Last 24 Hrs  T(C): 35.6 (13 Feb 2019 14:13), Max: 36.6 (12 Feb 2019 19:00)  T(F): 96 (13 Feb 2019 14:13), Max: 97.8 (12 Feb 2019 19:00)  HR: 107 (13 Feb 2019 14:13) (82 - 107)  BP: 123/58 (13 Feb 2019 14:13) (121/59 - 144/69)  BP(mean): 84 (12 Feb 2019 20:59) (84 - 91)  RR: 18 (13 Feb 2019 14:13) (18 - 33)  SpO2: 92% (13 Feb 2019 09:00) (89% - 97%)    PHYSICAL EXAM:  GENERAL: NAD, on nasal canula O2  HEAD:  Atraumatic, Normocephalic  NERVOUS SYSTEM:  Alert & Oriented X3, no focal deficits  CHEST/LUNG:  bilateral wheezing  HEART: Regular rate and rhythm; No murmurs, rubs, or gallops  ABDOMEN: Soft, Nontender, Nondistended; Bowel sounds present  EXTREMITIES:  2+ Peripheral Pulses, No clubbing, cyanosis, or edema  LYMPH: No lymphadenopathy noted  SKIN: No rashes or lesions    LABS                    14.0   5.93  )-----------( 288      ( 13 Feb 2019 06:31 )             44.6   02-13    142  |  99  |  26<H>  ----------------------------<  133<H>  4.8   |  32  |  0.7    Ca    9.1      13 Feb 2019 06:31    RADIOLOGY  < from: Xray Chest 1 View- PORTABLE-Urgent (02.10.19 @ 08:28) >  Impression:      No radiographic evidence of acute cardiopulmonary disease.    < end of copied text >    MEDICATIONS  (STANDING):  ALBUTerol/ipratropium for Nebulization 3 milliLiter(s) Nebulizer every 4 hours  buDESOnide 160 MICROgram(s)/formoterol 4.5 MICROgram(s) Inhaler 2 Puff(s) Inhalation two times a day  heparin  Injectable 5000 Unit(s) SubCutaneous every 8 hours  methylPREDNISolone sodium succinate Injectable 40 milliGRAM(s) IV Push every 12 hours  montelukast 10 milliGRAM(s) Oral daily  pantoprazole    Tablet 40 milliGRAM(s) Oral before breakfast    MEDICATIONS  (PRN):

## 2019-02-13 NOTE — PROGRESS NOTE ADULT - SUBJECTIVE AND OBJECTIVE BOX
Patient is a 59y old  Female who presents with a chief complaint of shortness of breath (12 Feb 2019 10:13)    SUBJECTIVE:    Feels better, but still has a wheeze and a cough. No chest pain.     REVIEW OF SYSTEMS:  See HPI    PHYSICAL EXAM  Vital Signs Last 24 Hrs  T(C): 35.7 (13 Feb 2019 05:35), Max: 36.6 (12 Feb 2019 19:00)  T(F): 96.2 (13 Feb 2019 05:35), Max: 97.8 (12 Feb 2019 19:00)  HR: 82 (13 Feb 2019 05:35) (82 - 121)  BP: 129/80 (13 Feb 2019 05:35) (121/59 - 144/69)  BP(mean): 84 (12 Feb 2019 20:59) (84 - 108)  RR: 18 (13 Feb 2019 05:35) (16 - 45)  SpO2: 94% (13 Feb 2019 06:49) (89% - 97%)    General: In NAD  HEENT: MOUNA               Lymphatic system: No Cervical LN    Respiratory: Herrera BS, Bilateral expiratory wheezing   Cardiovascular: Regular  Gastrointestinal: Soft. + BS  Musculoskeletal: No clubbing.  moves all extremities.  Full range of motion    Skin: Warm.  Intact  Neurological: No motor or sensory deficit        LABS:                          14.0   5.93  )-----------( 288      ( 13 Feb 2019 06:31 )             44.6                                               02-12    144  |  100  |  30<H>  ----------------------------<  122<H>  5.2<H>   |  34<H>  |  0.8    Ca    9.3      12 Feb 2019 06:31                                                                                                                                 ABG - ( 12 Feb 2019 05:36 )  pH, Arterial: 7.44  pH, Blood: x     /  pCO2: 49    /  pO2: 69    / HCO3: 33    / Base Excess: 7.6   /  SaO2: 95                  MEDICATIONS  (STANDING):  ALBUTerol/ipratropium for Nebulization 3 milliLiter(s) Nebulizer every 4 hours  buDESOnide 160 MICROgram(s)/formoterol 4.5 MICROgram(s) Inhaler 2 Puff(s) Inhalation two times a day  heparin  Injectable 5000 Unit(s) SubCutaneous every 8 hours  methylPREDNISolone sodium succinate Injectable 60 milliGRAM(s) IV Push three times a day  montelukast 10 milliGRAM(s) Oral daily    MEDICATIONS  (PRN):

## 2019-02-13 NOTE — PROGRESS NOTE ADULT - ASSESSMENT
Impression  >Hypercapnic respiratory failure - acute on chronic   >Uncontrolled Asthma/COPD with exacerbation   >NARDA untreated   >RSV     Recommendations    Solumedrol 60 q 12   Duonebs q 4 plus prn   Azithromycin   HS AVAPS   May benefit from NIV at DC - please run by the insurance if can be approved   Keep satrs >88- 92%   DVT px   PPI   Will follow Impression  >Hypercapnic respiratory failure - acute on chronic   >Uncontrolled Asthma/COPD with exacerbation   >NARDA untreated   >RSV     Recommendations    Solumedrol 60 q 12   Duonebs q 4 plus prn   Azithromycin   HS AVAPS   Keep satrs >88- 92%   DVT px   PPI   Will follow

## 2019-02-14 LAB
ANION GAP SERPL CALC-SCNC: 11 MMOL/L — SIGNIFICANT CHANGE UP (ref 7–14)
BUN SERPL-MCNC: 28 MG/DL — HIGH (ref 10–20)
CALCIUM SERPL-MCNC: 8.6 MG/DL — SIGNIFICANT CHANGE UP (ref 8.5–10.1)
CHLORIDE SERPL-SCNC: 102 MMOL/L — SIGNIFICANT CHANGE UP (ref 98–110)
CO2 SERPL-SCNC: 30 MMOL/L — SIGNIFICANT CHANGE UP (ref 17–32)
CREAT SERPL-MCNC: 0.7 MG/DL — SIGNIFICANT CHANGE UP (ref 0.7–1.5)
GLUCOSE SERPL-MCNC: 100 MG/DL — HIGH (ref 70–99)
HCT VFR BLD CALC: 43.3 % — SIGNIFICANT CHANGE UP (ref 37–47)
HGB BLD-MCNC: 13.7 G/DL — SIGNIFICANT CHANGE UP (ref 12–16)
MCHC RBC-ENTMCNC: 28.9 PG — SIGNIFICANT CHANGE UP (ref 27–31)
MCHC RBC-ENTMCNC: 31.6 G/DL — LOW (ref 32–37)
MCV RBC AUTO: 91.4 FL — SIGNIFICANT CHANGE UP (ref 81–99)
NRBC # BLD: 0 /100 WBCS — SIGNIFICANT CHANGE UP (ref 0–0)
PLATELET # BLD AUTO: 269 K/UL — SIGNIFICANT CHANGE UP (ref 130–400)
POTASSIUM SERPL-MCNC: 4.5 MMOL/L — SIGNIFICANT CHANGE UP (ref 3.5–5)
POTASSIUM SERPL-SCNC: 4.5 MMOL/L — SIGNIFICANT CHANGE UP (ref 3.5–5)
RBC # BLD: 4.74 M/UL — SIGNIFICANT CHANGE UP (ref 4.2–5.4)
RBC # FLD: 13.6 % — SIGNIFICANT CHANGE UP (ref 11.5–14.5)
SODIUM SERPL-SCNC: 143 MMOL/L — SIGNIFICANT CHANGE UP (ref 135–146)
WBC # BLD: 8.09 K/UL — SIGNIFICANT CHANGE UP (ref 4.8–10.8)
WBC # FLD AUTO: 8.09 K/UL — SIGNIFICANT CHANGE UP (ref 4.8–10.8)

## 2019-02-14 RX ADMIN — Medication 3 MILLILITER(S): at 08:00

## 2019-02-14 RX ADMIN — MONTELUKAST 10 MILLIGRAM(S): 4 TABLET, CHEWABLE ORAL at 14:14

## 2019-02-14 RX ADMIN — PANTOPRAZOLE SODIUM 40 MILLIGRAM(S): 20 TABLET, DELAYED RELEASE ORAL at 10:26

## 2019-02-14 RX ADMIN — Medication 3 MILLILITER(S): at 19:53

## 2019-02-14 RX ADMIN — Medication 3 MILLILITER(S): at 11:37

## 2019-02-14 RX ADMIN — Medication 3 MILLILITER(S): at 15:56

## 2019-02-14 RX ADMIN — Medication 40 MILLIGRAM(S): at 10:27

## 2019-02-14 RX ADMIN — Medication 3 MILLILITER(S): at 00:34

## 2019-02-14 RX ADMIN — Medication 40 MILLIGRAM(S): at 20:03

## 2019-02-14 RX ADMIN — BUDESONIDE AND FORMOTEROL FUMARATE DIHYDRATE 2 PUFF(S): 160; 4.5 AEROSOL RESPIRATORY (INHALATION) at 07:59

## 2019-02-14 RX ADMIN — HEPARIN SODIUM 5000 UNIT(S): 5000 INJECTION INTRAVENOUS; SUBCUTANEOUS at 14:13

## 2019-02-14 RX ADMIN — Medication 3 MILLILITER(S): at 05:22

## 2019-02-14 NOTE — PHYSICAL THERAPY INITIAL EVALUATION ADULT - GAIT DISTANCE, PT EVAL
60 feet (distance limited secondary to patient did not want to wear mask to ambulate in hallway on droplet precautions)

## 2019-02-14 NOTE — PROGRESS NOTE ADULT - SUBJECTIVE AND OBJECTIVE BOX
Patient is a 59y old  Female who presents with a chief complaint of shortness of breath (13 Feb 2019 15:42)    SUBJECTIVE:    on the chair   Looks better   No wheezing     REVIEW OF SYSTEMS:  See HPI    PHYSICAL EXAM  Vital Signs Last 24 Hrs  T(C): 35.6 (14 Feb 2019 06:00), Max: 36 (13 Feb 2019 23:01)  T(F): 96 (14 Feb 2019 06:00), Max: 96.8 (13 Feb 2019 23:01)  HR: 73 (14 Feb 2019 06:00) (73 - 107)  BP: 112/69 (14 Feb 2019 06:00) (108/61 - 123/58)  BP(mean): --  RR: 18 (14 Feb 2019 06:00) (16 - 18)  SpO2: --    General: In NAD  HEENT: MOUNA               Lymphatic system: No Cervical LN    Respiratory: Herrera BS  Cardiovascular: Regular  Gastrointestinal: Soft. + BS  Musculoskeletal: No clubbing.  moves all extremities.  Full range of motion    Skin: Warm.  Intact  Neurological: No motor or sensory deficit        LABS:                          14.0   5.93  )-----------( 288      ( 13 Feb 2019 06:31 )             44.6                                               02-13    142  |  99  |  26<H>  ----------------------------<  133<H>  4.8   |  32  |  0.7    Ca    9.1      13 Feb 2019 06:31    MEDICATIONS  (STANDING):  ALBUTerol/ipratropium for Nebulization 3 milliLiter(s) Nebulizer every 4 hours  buDESOnide 160 MICROgram(s)/formoterol 4.5 MICROgram(s) Inhaler 2 Puff(s) Inhalation two times a day  heparin  Injectable 5000 Unit(s) SubCutaneous every 8 hours  methylPREDNISolone sodium succinate Injectable 40 milliGRAM(s) IV Push every 12 hours  montelukast 10 milliGRAM(s) Oral daily  pantoprazole    Tablet 40 milliGRAM(s) Oral before breakfast    MEDICATIONS  (PRN):

## 2019-02-14 NOTE — PHYSICAL THERAPY INITIAL EVALUATION ADULT - GENERAL OBSERVATIONS, REHAB EVAL
13:00 - 13:25.  Chart reviewed. Patient available to be seen for physical therapy, confirmed with nurse. Patient encountered already out of bed in chair.  Patient denies pain at rest, agreeable for PT evaluation.

## 2019-02-14 NOTE — PROGRESS NOTE ADULT - SUBJECTIVE AND OBJECTIVE BOX
Patient is a 59y old  Female who presents with a chief complaint of shortness of breath (12 Feb 2019 08:53)    -pt seen and examined at bedside, CCU downgrade, Pulmonary following  -continue with IV steroids; re-assess in am for home discharge tomorrow   -patient ambulated today with no difficulty and resp status stable       Vital Signs Last 24 Hrs  T(C): 36.2 (14 Feb 2019 14:53), Max: 36.2 (14 Feb 2019 14:53)  T(F): 97.2 (14 Feb 2019 14:53), Max: 97.2 (14 Feb 2019 14:53)  HR: 104 (14 Feb 2019 14:53) (73 - 116)  BP: 98/57 (14 Feb 2019 14:53) (98/57 - 112/69)  RR: 16 (14 Feb 2019 14:53) (16 - 22)  SpO2: 91% (14 Feb 2019 10:30) (90% - 97%)    PHYSICAL EXAM:  GENERAL: NAD, on nasal canula O2  HEAD:  Atraumatic, Normocephalic  NERVOUS SYSTEM:  Alert & Oriented X3, no focal deficits  CHEST/LUNG:  bilateral wheezing  CV/HEART: Regular rate and rhythm; No murmurs, rubs, or gallops  GI/ABDOMEN: Soft, Nontender, Nondistended; Bowel sounds present  EXTREMITIES:  2+ Peripheral Pulses, No clubbing, cyanosis, or edema  LYMPH: No lymphadenopathy noted  SKIN: No rashes or lesions    LABS                               13.7   8.09  )-----------( 269      ( 14 Feb 2019 09:57 )             43.3   02-14    143  |  102  |  28<H>  ----------------------------<  100<H>  4.5   |  30  |  0.7    Ca    8.6      14 Feb 2019 09:57    RADIOLOGY  < from: Xray Chest 1 View- PORTABLE-Urgent (02.10.19 @ 08:28) >  Impression:      No radiographic evidence of acute cardiopulmonary disease.    < end of copied text >    MEDICATIONS  (STANDING):  ALBUTerol/ipratropium for Nebulization 3 milliLiter(s) Nebulizer every 4 hours  buDESOnide 160 MICROgram(s)/formoterol 4.5 MICROgram(s) Inhaler 2 Puff(s) Inhalation two times a day  heparin  Injectable 5000 Unit(s) SubCutaneous every 8 hours  methylPREDNISolone sodium succinate Injectable 40 milliGRAM(s) IV Push every 12 hours  montelukast 10 milliGRAM(s) Oral daily  pantoprazole    Tablet 40 milliGRAM(s) Oral before breakfast

## 2019-02-14 NOTE — PROGRESS NOTE ADULT - ASSESSMENT
Impression  >Hypercapnic respiratory failure - acute on chronic   >Uncontrolled Asthma/COPD with exacerbation   >NARDA untreated   >RSV     Recommendations    Change to Prednisone 60 with a slow taper   Duonebs q 4 prn   Start ICS/LABA   HS AVAPS   Keep satrs >88- 92%   DVT px   PPI   Okay to DC home from pulm standpoint in 24 hours Impression  >Hypercapnic respiratory failure - acute on chronic   >Uncontrolled Asthma/COPD with exacerbation   >NARDA untreated   >RSV     Recommendations    Change to Prednisone 60 eva  with a slow taper   Duonebs q 4 prn   Start ICS/LABA   HS AVAPS   Keep satrs >88- 92%   DVT px   PPI   Okay to DC home from pulm standpoint in 24 hours

## 2019-02-15 ENCOUNTER — TRANSCRIPTION ENCOUNTER (OUTPATIENT)
Age: 59
End: 2019-02-15

## 2019-02-15 VITALS — OXYGEN SATURATION: 93 %

## 2019-02-15 RX ORDER — IPRATROPIUM/ALBUTEROL SULFATE 18-103MCG
3 AEROSOL WITH ADAPTER (GRAM) INHALATION
Qty: 0 | Refills: 0 | COMMUNITY
Start: 2019-02-15

## 2019-02-15 RX ORDER — PANTOPRAZOLE SODIUM 20 MG/1
1 TABLET, DELAYED RELEASE ORAL
Qty: 30 | Refills: 0 | OUTPATIENT
Start: 2019-02-15 | End: 2019-03-16

## 2019-02-15 RX ADMIN — PANTOPRAZOLE SODIUM 40 MILLIGRAM(S): 20 TABLET, DELAYED RELEASE ORAL at 08:09

## 2019-02-15 RX ADMIN — Medication 40 MILLIGRAM(S): at 08:09

## 2019-02-15 RX ADMIN — Medication 3 MILLILITER(S): at 12:47

## 2019-02-15 RX ADMIN — Medication 3 MILLILITER(S): at 04:37

## 2019-02-15 RX ADMIN — MONTELUKAST 10 MILLIGRAM(S): 4 TABLET, CHEWABLE ORAL at 12:32

## 2019-02-15 RX ADMIN — Medication 60 MILLIGRAM(S): at 12:32

## 2019-02-15 RX ADMIN — Medication 3 MILLILITER(S): at 00:59

## 2019-02-15 RX ADMIN — BUDESONIDE AND FORMOTEROL FUMARATE DIHYDRATE 2 PUFF(S): 160; 4.5 AEROSOL RESPIRATORY (INHALATION) at 08:09

## 2019-02-15 RX ADMIN — Medication 3 MILLILITER(S): at 08:37

## 2019-02-15 NOTE — PROGRESS NOTE ADULT - REASON FOR ADMISSION
shortness of breath

## 2019-02-15 NOTE — PROGRESS NOTE ADULT - PROBLEM SELECTOR PROBLEM 4
NARDA (obstructive sleep apnea)

## 2019-02-15 NOTE — DISCHARGE NOTE ADULT - PATIENT PORTAL LINK FT
You can access the [email or web address], offered by [facility], by registering with the following website: [email or web address]

## 2019-02-15 NOTE — DISCHARGE NOTE ADULT - CARE PROVIDER_API CALL
Raymundo Alejandra)  Critical Care Medicine; Pulmonary Disease; Sleep Medicine  [address] [place] [address]  Phone: [phone]  Fax: [phone]  Follow Up Time:

## 2019-02-15 NOTE — PROGRESS NOTE ADULT - PROBLEM SELECTOR PLAN 4
-needs outpatient sleep apnea for CPAP (pt will follow with her pulmonologist     # Progress Note Handoff  PENDING as follows  consults: pulmonary follow up noted   Test: stable labs  Other:  Family discussion:  Disposition:  Home __x today__ or SNF ____ Other _____ Unknown at this time __
-needs outpatient sleep apnea      # Progress Note Handoff  PENDING as follows  consults: pulmonary follow up noted   Test: chest xray in am   Other:  Family discussion:  Disposition:  Home ____ or SNF ____ Other _____ Unknown at this time __X__
-needs outpatient sleep apnea      # Progress Note Handoff  PENDING as follows  consults: pulmonary follow up noted   Test: stable labs  Other:  Family discussion:  Disposition:  Home __x in 24 hrs__ or SNF ____ Other _____ Unknown at this time __
bipap during sleep

## 2019-02-15 NOTE — DISCHARGE NOTE ADULT - MEDICATION SUMMARY - MEDICATIONS TO STOP TAKING
I will STOP taking the medications listed below when I get home from the hospital:    amoxicillin-clavulanate 875 mg-125 mg oral tablet  -- 1  by mouth 2 times a day   -- Finish all this medication unless otherwise directed by prescriber.  Take with food or milk.

## 2019-02-15 NOTE — PROGRESS NOTE ADULT - SUBJECTIVE AND OBJECTIVE BOX
Patient is a 59y old  Female who presents with a chief complaint of shortness of breath (12 Feb 2019 08:53)    -pt seen and examined at bedside, CCU downgrade, Pulmonary following  -patient is on room air, ambulating with no dyspnea  -will give dose of prednisone in the hospital (questionable rash history); if tolerated; patient will be sent home on tapering dose of prednisone with PPI  -pt has Breo and Nebs at home  -Spoke to Pulmonary this morning; outpatient Pulm follow up with Dr. Perez     Vital Signs Last 24 Hrs  T(C): 36.7 (15 Feb 2019 05:08), Max: 37.2 (14 Feb 2019 22:49)  T(F): 98 (15 Feb 2019 05:08), Max: 99 (14 Feb 2019 22:49)  HR: 81 (15 Feb 2019 05:08) (81 - 104)  BP: 133/60 (15 Feb 2019 05:08) (98/57 - 133/60)  RR: 16 (15 Feb 2019 05:08) (16 - 16)  SpO2: 93% (15 Feb 2019 08:14) (93% - 93%)    PHYSICAL EXAM:  GENERAL: NAD, on nasal canula O2  HEAD:  Atraumatic, Normocephalic  NERVOUS SYSTEM:  Alert & Oriented X3, no focal deficits  CHEST/LUNG:  clear with minimal wheezing   CV/HEART: Regular rate and rhythm; No murmurs, rubs, or gallops  GI/ABDOMEN: Soft, Nontender, Nondistended; Bowel sounds present  EXTREMITIES:  2+ Peripheral Pulses, No clubbing, cyanosis, or edema  LYMPH: No lymphadenopathy noted  SKIN: No rashes or lesions    LABS                      13.7   8.09  )-----------( 269      ( 14 Feb 2019 09:57 )             43.3   02-14    143  |  102  |  28<H>  ----------------------------<  100<H>  4.5   |  30  |  0.7    Ca    8.6      14 Feb 2019 09:57    RADIOLOGY  < from: Xray Chest 1 View- PORTABLE-Urgent (02.10.19 @ 08:28) >  Impression:      No radiographic evidence of acute cardiopulmonary disease.    < end of copied text >    MEDICATIONS  (STANDING):  ALBUTerol/ipratropium for Nebulization 3 milliLiter(s) Nebulizer every 4 hours  buDESOnide 160 MICROgram(s)/formoterol 4.5 MICROgram(s) Inhaler 2 Puff(s) Inhalation two times a day  heparin  Injectable 5000 Unit(s) SubCutaneous every 8 hours  methylPREDNISolone sodium succinate Injectable 40 milliGRAM(s) IV Push every 12 hours  montelukast 10 milliGRAM(s) Oral daily  pantoprazole    Tablet 40 milliGRAM(s) Oral before breakfast

## 2019-02-15 NOTE — DISCHARGE NOTE ADULT - CARE PLAN
Principal Discharge DX:	Chronic obstructive pulmonary disease with acute exacerbation  Goal:	to be symptom free  Assessment and plan of treatment:	-take your medications as prescribed and follow up with your physicians and Pulmonologist

## 2019-02-15 NOTE — PROGRESS NOTE ADULT - PROVIDER SPECIALTY LIST ADULT
Hospitalist
Internal Medicine
Internal Medicine
Pulmonology
Hospitalist

## 2019-02-15 NOTE — PROGRESS NOTE ADULT - PROBLEM SELECTOR PLAN 1
-resolved
-resolved
-resolved   -AVAP prn as per pulmonary team
resolved  stable for downgrade to floor

## 2019-02-15 NOTE — DISCHARGE NOTE ADULT - MEDICATION SUMMARY - MEDICATIONS TO TAKE
I will START or STAY ON the medications listed below when I get home from the hospital:    predniSONE 10 mg oral tablet  -- 6 tab(s) by mouth once a day 3 days then 4 tabs once daily 3 days then 2 tabs once daily 3 days, then 1 tab once daily 3 days  -- It is very important that you take or use this exactly as directed.  Do not skip doses or discontinue unless directed by your doctor.  Obtain medical advice before taking any non-prescription drugs as some may affect the action of this medication.  Take with food or milk.    -- Indication: For Copd    Breo Ellipta 100 mcg-25 mcg/inh inhalation powder  -- 1 puff(s) inhaled once a day (at bedtime)  -- Indication: For Copd    ipratropium-albuterol 0.5 mg-2.5 mg/3 mLinhalation solution  -- 3 milliliter(s) inhaled 4 times a day as needed for Shortness of breath  -- Indication: For for shortness of breath    Singulair 10 mg oral tablet  -- 1 tab(s) by mouth once a day (at bedtime)  -- Indication: For Copd    pantoprazole 40 mg oral delayed release tablet  -- 1 tab(s) by mouth once a day (before a meal)  -- Indication: For while on steroids

## 2019-02-15 NOTE — PROGRESS NOTE ADULT - PROBLEM SELECTOR PROBLEM 2
Chronic obstructive pulmonary disease with acute exacerbation

## 2019-02-15 NOTE — PROGRESS NOTE ADULT - ASSESSMENT
Impression  >Hypercapnic respiratory failure - acute on chronic   >Uncontrolled Asthma/COPD with exacerbation   >NARDA untreated   >RSV     Recommendations    Change to Prednisone 60 with taper   Cont home breo   Prn albuterol   OP follow up with Dr. Alejandra   DVT px   PPI   Okay to DC home from pulm standpoint Impression  >Hypercapnic respiratory failure - acute on chronic   >Uncontrolled Asthma/COPD with exacerbation   >NARDA untreated   >RSV     Recommendations    Change to Prednisone 60 with taper   Cont home breo and spiriva   Prn albuterol   OP follow up with Dr. Alejandra   DVT px   PPI   Okay to DC home from pulm standpoint

## 2019-02-15 NOTE — DISCHARGE NOTE ADULT - PLAN OF CARE
to be symptom free -take your medications as prescribed and follow up with your physicians and Pulmonologist

## 2019-02-15 NOTE — DISCHARGE NOTE ADULT - HOSPITAL COURSE
Assessment and Plan:    Problem/Plan - 1:  ·  Problem: Acute respiratory failure with hypoxia and hypercapnia.  Plan: -resolved.      Problem/Plan - 2:  ·  Problem: Chronic obstructive pulmonary disease with acute exacerbation.  Plan: continue IV steroids and resp nebs q4h  sq heparin for DVT prophylaxis  -add PPI as pt is on steroids.      Problem/Plan - 3:  ·  Problem: RSV infection.  Plan: supportive.      Problem/Plan - 4:  ·  Problem: NARDA (obstructive sleep apnea).  Plan: -needs outpatient sleep apnea for CPAP (pt will follow with her pulmonologist     NO CKD ***patient seen and examined at bedside. Spent over 40mins d/c planning, d/c papers and med rec ***      Assessment and Plan:    Problem/Plan - 1:  ·  Problem: Acute respiratory failure with hypoxia and hypercapnia.  Plan: -resolved.      Problem/Plan - 2:  ·  Problem: Chronic obstructive pulmonary disease with acute exacerbation.  Plan: continue IV steroids and resp nebs q4h  sq heparin for DVT prophylaxis  -add PPI as pt is on steroids.      Problem/Plan - 3:  ·  Problem: RSV infection.  Plan: supportive.      Problem/Plan - 4:  ·  Problem: NARDA (obstructive sleep apnea).  Plan: -needs outpatient sleep apnea for CPAP (pt will follow with her pulmonologist     NO CKD     patient tolerated prednisone and has history of mild rash and is okay with going home on prednisone

## 2019-02-15 NOTE — PROGRESS NOTE ADULT - SUBJECTIVE AND OBJECTIVE BOX
Patient is a 59y old  Female who presents with a chief complaint of shortness of breath (14 Feb 2019 15:40)    SUBJECTIVE:    feels better  No wheezing     REVIEW OF SYSTEMS:  See HPI    PHYSICAL EXAM  Vital Signs Last 24 Hrs  T(C): 36.7 (15 Feb 2019 05:08), Max: 37.2 (14 Feb 2019 22:49)  T(F): 98 (15 Feb 2019 05:08), Max: 99 (14 Feb 2019 22:49)  HR: 81 (15 Feb 2019 05:08) (81 - 116)  BP: 133/60 (15 Feb 2019 05:08) (98/57 - 133/60)  BP(mean): --  RR: 16 (15 Feb 2019 05:08) (16 - 22)  SpO2: 93% (15 Feb 2019 08:14) (90% - 93%)    General: In NAD  HEENT: MOUNA               Lymphatic system: No Cervical LN    Respiratory: Herrera BS  Cardiovascular: Regular  Gastrointestinal: Soft. + BS  Musculoskeletal: No clubbing.  moves all extremities.  Full range of motion    Skin: Warm.  Intact  Neurological: No motor or sensory deficit        LABS:                          13.7   8.09  )-----------( 269      ( 14 Feb 2019 09:57 )             43.3                                               02-14    143  |  102  |  28<H>  ----------------------------<  100<H>  4.5   |  30  |  0.7    Ca    8.6      14 Feb 2019 09:57                                                                                                                                                                                    MEDICATIONS  (STANDING):  ALBUTerol/ipratropium for Nebulization 3 milliLiter(s) Nebulizer every 4 hours  buDESOnide 160 MICROgram(s)/formoterol 4.5 MICROgram(s) Inhaler 2 Puff(s) Inhalation two times a day  heparin  Injectable 5000 Unit(s) SubCutaneous every 8 hours  methylPREDNISolone sodium succinate Injectable 40 milliGRAM(s) IV Push every 12 hours  montelukast 10 milliGRAM(s) Oral daily  pantoprazole    Tablet 40 milliGRAM(s) Oral before breakfast    MEDICATIONS  (PRN):

## 2019-02-15 NOTE — PROGRESS NOTE ADULT - PROBLEM SELECTOR PROBLEM 1
Acute respiratory failure with hypoxia and hypercapnia

## 2019-02-15 NOTE — PROGRESS NOTE ADULT - PROBLEM SELECTOR PLAN 2
continue IV steroids and resp nebs q4h  sq heparin for DVT prophylaxis  -add PPI as pt is on steroids
continue IV steroids and resp nebs q4h  sq heparin for DVT prophylaxis
continue IV steroids and resp nebs q4h  sq heparin for DVT prophylaxis  -add PPI as pt is on steroids
continue IV steroids and resp nebs q4h  sq heparin for DVT prophylaxis  -add PPI as pt is on steroids

## 2019-02-19 DIAGNOSIS — G47.33 OBSTRUCTIVE SLEEP APNEA (ADULT) (PEDIATRIC): ICD-10-CM

## 2019-02-19 DIAGNOSIS — J96.01 ACUTE RESPIRATORY FAILURE WITH HYPOXIA: ICD-10-CM

## 2019-02-19 DIAGNOSIS — R06.02 SHORTNESS OF BREATH: ICD-10-CM

## 2019-02-19 DIAGNOSIS — J45.901 UNSPECIFIED ASTHMA WITH (ACUTE) EXACERBATION: ICD-10-CM

## 2019-02-19 DIAGNOSIS — Z86.74 PERSONAL HISTORY OF SUDDEN CARDIAC ARREST: ICD-10-CM

## 2019-02-19 DIAGNOSIS — J96.02 ACUTE RESPIRATORY FAILURE WITH HYPERCAPNIA: ICD-10-CM

## 2019-02-19 DIAGNOSIS — B97.4 RESPIRATORY SYNCYTIAL VIRUS AS THE CAUSE OF DISEASES CLASSIFIED ELSEWHERE: ICD-10-CM

## 2019-02-19 DIAGNOSIS — F41.9 ANXIETY DISORDER, UNSPECIFIED: ICD-10-CM

## 2019-02-19 DIAGNOSIS — J44.1 CHRONIC OBSTRUCTIVE PULMONARY DISEASE WITH (ACUTE) EXACERBATION: ICD-10-CM

## 2019-02-19 DIAGNOSIS — Z87.891 PERSONAL HISTORY OF NICOTINE DEPENDENCE: ICD-10-CM

## 2019-02-19 DIAGNOSIS — E87.2 ACIDOSIS: ICD-10-CM

## 2019-07-09 NOTE — ED PROVIDER NOTE - CARE PLAN
Principal Discharge DX:	Cardiopulmonary arrest with successful resuscitation  Secondary Diagnosis:	Asthma  Secondary Diagnosis:	COPD (chronic obstructive pulmonary disease) .

## 2019-09-26 ENCOUNTER — APPOINTMENT (OUTPATIENT)
Dept: OTOLARYNGOLOGY | Facility: CLINIC | Age: 59
End: 2019-09-26
Payer: COMMERCIAL

## 2019-09-26 VITALS
DIASTOLIC BLOOD PRESSURE: 80 MMHG | HEIGHT: 64 IN | SYSTOLIC BLOOD PRESSURE: 124 MMHG | BODY MASS INDEX: 35 KG/M2 | WEIGHT: 205 LBS

## 2019-09-26 DIAGNOSIS — Z80.9 FAMILY HISTORY OF MALIGNANT NEOPLASM, UNSPECIFIED: ICD-10-CM

## 2019-09-26 DIAGNOSIS — Z78.9 OTHER SPECIFIED HEALTH STATUS: ICD-10-CM

## 2019-09-26 DIAGNOSIS — Z87.891 PERSONAL HISTORY OF NICOTINE DEPENDENCE: ICD-10-CM

## 2019-09-26 PROBLEM — Z00.00 ENCOUNTER FOR PREVENTIVE HEALTH EXAMINATION: Status: ACTIVE | Noted: 2019-09-26

## 2019-09-26 PROCEDURE — 99203 OFFICE O/P NEW LOW 30 MIN: CPT | Mod: 25

## 2019-09-26 PROCEDURE — 31231 NASAL ENDOSCOPY DX: CPT

## 2019-09-26 NOTE — HISTORY OF PRESENT ILLNESS
[de-identified] : Patient here today c/o clogged left ear and frequent sinus infections. She is experiencing anosmia, which will improves when she is on steroids for her asthma. Patient admits getting infections every 3-6 months. Currently c/o greenish mucus discharge for about 2 weeks. No fevers. Constant rhinitis. Patient has history of nasal polyps removed in 2015. She has h/o seasonal allergies and asthma. She takes Zyrtec for allergies and Flonase prn. \par Patient also c/o left clogged ear. She has hearing loss in left ear with tinnitus.

## 2019-09-26 NOTE — PROCEDURE
[Topical Lidocaine] : topical lidocaine [Recalcitrant Symptoms] : recalcitrant symptoms  [Oxymetazoline HCl] : oxymetazoline HCl [Normal] : the paranasal sinuses had no abnormalities [___ cm] : bilateral [unfilled]Ucm maxillary polyp(s) [FreeTextEntry6] : The following anatomic sites were directly examined in a sequential fashion:\par The scope was introduced in the nasal passage between the middle and inferior turbinates to exam the inferior portion of the middle meatus and the fontanelle, as well as the maxillary ostia. Next, the scope was passed medically and posteriorly to the middle turbinates to examine the sphenoethmoid recess and the superior turbinate region.\par \par polyps have returned [de-identified] : nasal polyps

## 2019-09-26 NOTE — REASON FOR VISIT
[Initial Evaluation] : an initial evaluation for [FreeTextEntry2] : sinus infection, clogged left ear

## 2019-10-28 ENCOUNTER — APPOINTMENT (OUTPATIENT)
Dept: OTOLARYNGOLOGY | Facility: CLINIC | Age: 59
End: 2019-10-28

## 2019-12-09 ENCOUNTER — APPOINTMENT (OUTPATIENT)
Dept: OTOLARYNGOLOGY | Facility: CLINIC | Age: 59
End: 2019-12-09
Payer: COMMERCIAL

## 2019-12-09 PROCEDURE — 69420 INCISION OF EARDRUM: CPT | Mod: LT

## 2019-12-09 PROCEDURE — 92550 TYMPANOMETRY & REFLEX THRESH: CPT

## 2019-12-09 PROCEDURE — 99213 OFFICE O/P EST LOW 20 MIN: CPT | Mod: 25

## 2019-12-09 PROCEDURE — 92557 COMPREHENSIVE HEARING TEST: CPT

## 2019-12-09 NOTE — REVIEW OF SYSTEMS
[As Noted in HPI] : as noted in HPI [Negative] : Heme/Lymph [FreeTextEntry1] : all other ROS negative

## 2019-12-09 NOTE — HISTORY OF PRESENT ILLNESS
[FreeTextEntry1] : 12/9/19 Patient is following up for nasal polyps. Patient was prescribed Xhance and has not found any relief in regards to her left ear clogged like sensation. Clogged sensation is constant in nature. Notes temporary relief with blowing her nose, however notes clogged sensation returns.

## 2019-12-09 NOTE — PROCEDURE
[Risk and Benefits Discussed] : The purpose, risks, discomforts, benefits and alternatives of the procedure have been explained to the patient including no treatment. [FreeTextEntry4] : phenol [] : Myringotomy [Same] : same as the Pre Op Dx.

## 2020-01-02 ENCOUNTER — APPOINTMENT (OUTPATIENT)
Dept: OTOLARYNGOLOGY | Facility: CLINIC | Age: 60
End: 2020-01-02
Payer: COMMERCIAL

## 2020-01-02 DIAGNOSIS — J33.9 NASAL POLYP, UNSPECIFIED: ICD-10-CM

## 2020-01-02 PROCEDURE — 31231 NASAL ENDOSCOPY DX: CPT

## 2020-01-02 PROCEDURE — 92550 TYMPANOMETRY & REFLEX THRESH: CPT

## 2020-01-02 PROCEDURE — 99214 OFFICE O/P EST MOD 30 MIN: CPT | Mod: 25

## 2020-01-02 RX ORDER — FLUTICASONE PROPIONATE 93 UG/1
93 SPRAY, METERED NASAL
Qty: 1 | Refills: 10 | Status: ACTIVE | COMMUNITY
Start: 2019-09-26 | End: 1900-01-01

## 2020-01-02 NOTE — PROCEDURE
[Recalcitrant Symptoms] : recalcitrant symptoms  [Mass] : a mass [Topical Lidocaine] : topical lidocaine [Oxymetazoline HCl] : oxymetazoline HCl [Normal] : the nasopharynx was normal [___ cm] : bilateral [unfilled]Ucm maxillary polyp(s)

## 2020-01-02 NOTE — HISTORY OF PRESENT ILLNESS
[FreeTextEntry1] : Patient following up on middle ear effusion. Patient had myringotomy performed last visit; patient states her ear feels clogged. Patient notes relief of symptoms for about 1 week s/p procedure, however notes clogged sensation returned shortly after.

## 2020-11-14 ENCOUNTER — OUTPATIENT (OUTPATIENT)
Dept: OUTPATIENT SERVICES | Facility: HOSPITAL | Age: 60
LOS: 1 days | Discharge: HOME | End: 2020-11-14

## 2020-11-14 DIAGNOSIS — J33.9 NASAL POLYP, UNSPECIFIED: Chronic | ICD-10-CM

## 2020-11-14 DIAGNOSIS — Z90.49 ACQUIRED ABSENCE OF OTHER SPECIFIED PARTS OF DIGESTIVE TRACT: Chronic | ICD-10-CM

## 2020-11-14 DIAGNOSIS — Z11.59 ENCOUNTER FOR SCREENING FOR OTHER VIRAL DISEASES: ICD-10-CM

## 2020-11-16 NOTE — PRE-ANESTHESIA EVALUATION ADULT - NSATTENDATTESTRD_GEN_ALL_CORE
Chief Complaint   Patient presents with    Follow-up     issues as noted below       History obtained from the patient. SUBJECTIVE:  Hung Paulson is a 44 y.o.  that presents today for       -foot swelling/toe numbness LAST VISIT:  Has had numbness in the R lesser toes on and off for at least a year  Worse the last few wks  Also with new onset bilat pedal edema the last 3 to 4 wks  Better in AM, worse as day goes on  Very mild swelling today has off feet  No diet changes  No CP/SOB, orthopnea or PND  No pelvic pain  No leg edema  On norvasc for BP  Dose inc this summer  BP typically <140/90    UPDATE TODAY:   Overall doing better  Swelling gone with change in BP meds from norvasc to cozaar  Still having numbness in toes  Labs neg  EMG c/w R L5 radic  Hx of back surgery  Notes when back worse, numbness worse  Taking tizanadine and is helping  Discussed PT, wants to hold on this  Willing to do HEP  May see chiro       BPs <140/90 with change in meds. No CP/SOB    Wt Readings from Last 3 Encounters:   11/17/20 293 lb (132.9 kg)   10/06/20 291 lb (132 kg)   08/20/20 297 lb 3.2 oz (134.8 kg)       Age/Gender Health Maintenance    Lipid -   Lab Results   Component Value Date    CHOL 170 10/21/2020    CHOL 200 (H) 11/06/2019     Lab Results   Component Value Date    TRIG 97 10/21/2020    TRIG 122 11/06/2019     Lab Results   Component Value Date    HDL 45 10/21/2020    HDL 48 11/06/2019     Lab Results   Component Value Date    LDLCALC 106 10/21/2020    [address] 11/06/2019     No results found for: LABVLDL, VLDL  No results found for: CHOLHDLRATIO      DM Screen -   Lab Results   Component Value Date    GLUCOSE 87 10/21/2020     TSH -   Lab Results   Component Value Date    TSH 2.350 10/21/2020       Colon Cancer Screening - age 49  Lung Cancer Screening (Age 55 to [de-identified] with 30 pack year hx, current smoker or quit within past 15 years) - never smoker.      Tetanus - UTD 2007  Influenza Vaccine - UTD FALL 2020  Pneumonia Vaccine - age 73  Zoster - age 49     Breast Cancer Screening - ordered OCT 2020  Cervical Cancer Screening - hyst for benign reasons  Osteoporosis Screening - age 62      Current Outpatient Medications   Medication Sig Dispense Refill    losartan (COZAAR) 50 MG tablet Take 1 tablet by mouth daily 90 tablet 3    levothyroxine (SYNTHROID) 25 MCG tablet TAKE 1 TABLET BY MOUTH DAILY 90 tablet 3    tiZANidine (ZANAFLEX) 4 MG tablet TAKE 1 TABLET BY MOUTH EVERY 8 HOURS AS NEEDED FOR RIGHT SIDED RIB PAIN OR SPASM 30 tablet 0     No current facility-administered medications for this visit. Orders Placed This Encounter   Medications    losartan (COZAAR) 50 MG tablet     Sig: Take 1 tablet by mouth daily     Dispense:  90 tablet     Refill:  3         All medications reviewed and reconciled, including OTC and herbal medications. Updated list given to patient.        Patient Active Problem List   Diagnosis    Patellofemoral stress syndrome of right knee    Right knee meniscal tear    Osteochondral defect of patella, right    Morbid obesity ([address]    Multiple thyroid nodules    Adrenal adenoma; bilateral. Stable and benign on CT    Hypothyroidism, postoperative    Hypertension, essential    S/P partial thyroidectomy       Past Medical History:   Diagnosis Date    Adrenal adenoma; bilateral. Stable and benign on CT     Hypertension, essential 11/11/2019    Hypothyroidism, postoperative     Morbid obesity ([address]     S/P partial thyroidectomy 11/11/2019       Past Surgical History:   Procedure Laterality Date    BACK SURGERY  2009    post aa    CARPAL TUNNEL RELEASE Right     DILATION AND CURETTAGE OF UTERUS      KNEE ARTHROSCOPY Right 11/05/2015    Medial Menesectomy with Chrondroplasty - Dr Zeinab Steiner N/A 3/9/2018    DIAGNOSTIC LAPAROSCOPY, APPENDECTOMY, infarcted appendix plancha performed by Lorin Silva MD at [address] [place] [address]  age 22 endometriosis.  THYROID LOBECTOMY Right 10/2019    [facility]    TONSILLECTOMY         Allergies   Allergen Reactions    Tape Beth Schilder Tape] Rash       Social History     Tobacco Use    Smoking status: Never Smoker    Smokeless tobacco: Never Used   Substance Use Topics    Alcohol use: No       Family History   Problem Relation Age of Onset    Colon Cancer Paternal Grandmother 80    Breast Cancer Maternal Grandmother 72    High Blood Pressure Mother     Diabetes Father     Ovarian Cancer Neg Hx     Tuberculosis Neg Hx          I have reviewed the patient's past medical history, past surgical history, allergies, medications, social and family history and I have made updates where appropriate. Review of Systems  Positive responses are highlighted in bold    Constitutional:  Fever, Chills, Night Sweats, Fatigue, Unexpected changes in weight  HENT:  Ear pain, Tinnitus, Nosebleeds, Trouble swallowing, Hearing loss, Sore throat  Cardiovascular:  Chest Pain, Palpitations, Orthopnea, Paroxysmal Nocturnal Dyspnea  Respiratory:  Cough, Wheezing, Shortness of breath, Chest tightness, Apnea  Gastrointestinal:  Nausea, Vomiting, Diarrhea, Constipation, Heartburn, Blood in stool  Genitourinary:  Difficulty or painful urination, Flank pain, Change in frequency, Urgency  Skin:  Color change, Rash, Itching, Wound  Musculoskeletal:  Joint pain, Back pain, Gait problems, Joint swelling, Myalgias  Neurological:  Dizziness, Headaches, Presyncope, Numbness, Seizures, Tremors  Endocrine:  Heat Intolerance, Cold Intolerance, Polydipsia, Polyphagia, Polyuria      PHYSICAL EXAM:  Vitals:    11/17/20 0812   BP: 104/70   Pulse: 76   Resp: 14   Temp: 97.8 °F (36.6 °C)   TempSrc: Oral   SpO2: 100%   Weight: 293 lb (132.9 kg)   Height: 5' 9\" (1.753 m)     Body mass index is 43.27 kg/m². Pain Score:    2(back)    VS Reviewed  General Appearance: A&O x 3, No acute distress,well developed and well- nourished  Eyes: pupils equal, round, and reactive to light, extraocular eye movements intact, conjunctivae and eye lids without erythema  ENT: external ear and ear canal clear bilaterally, TMs intact and regular, nose without deformity, nasal mucosa and turbinates normal without polyps, oropharynx normal, dentition is normal for age  Neck: supple and non-tender without mass, no thyromegaly or thyroid nodules, no cervical lymphadenopathy  Pulmonary/Chest: clear to auscultation bilaterally- no wheezes, rales or rhonchi, normal air movement, no respiratory distress or retractions  Cardiovascular: S1 and S2 auscultated w/ RRR. No murmurs, rubs, clicks, or gallops, distal pulses intact. Abdomen: soft, non-tender, non-distended, bowel sounds physiologic,  no rebound or guarding, no masses or hernias noted. Liver and spleen without enlargement. Extremities: no cyanosis, clubbing of the lower extremities. Trace bilat pedal edema. Dec sensation R 2nd toe.    Skin: warm and dry, no rash or erythema      Nurse Only on 10/21/2020   Component Date Value Ref Range Status    Vitamin B-12 10/21/2020 409  211 - 911 pg/mL Final    TIBC 10/21/2020 312  171 - 450 ug/dL Final    Iron 10/21/2020 56  50 - 170 ug/dL Final    Ferritin 10/21/2020 95  10 - 291 ng/mL Final    TSH 10/21/2020 2.350  0.400 - 4.200 uIU/mL Final    Microalbumin, Random Urine 10/21/2020 < 1.20  mg/dL Final    Creatinine, Urine 10/21/2020 96.7  mg/dL Final    Microalb/Creat Ratio 10/21/2020 12  0 - 30 mg/g Final    Cholesterol, Total 10/21/2020 170  100 - 199 mg/dL Final    Triglycerides 10/21/2020 97  0 - 199 mg/dL Final    HDL 10/21/2020 45  mg/dL Final    LDL Calculated 10/21/2020 106  mg/dL Final    Hemoglobin A1C 10/21/2020 5.6  4.4 - 6.4 % Final    AVERAGE GLUCOSE 10/21/2020 108  70 - 126 mg/dL Final    Glucose 10/21/2020 87  70 - 108 mg/dL Final    CREATININE 10/21/2020 0.7  0.4 - 1.2 mg/dL Final    BUN 10/21/2020 14  7 - 22 mg/dL Final    Sodium 10/21/2020 141  135 - 145 meq/L Final    Potassium 10/21/2020 4.1  3.5 - 5.2 meq/L Final    Chloride 10/21/2020 106  98 - 111 meq/L Final    CO2 10/21/2020 23  23 - 33 meq/L Final    Calcium 10/21/2020 9.4  8.5 - 10.5 mg/dL Final    AST 10/21/2020 13  5 - 40 U/L Final    Alkaline Phosphatase 10/21/2020 88  38 - 126 U/L Final    Total Protein 10/21/2020 7.4  6.1 - 8.0 g/dL Final    Alb 10/21/2020 4.3  3.5 - 5.1 g/dL Final    Total Bilirubin 10/21/2020 0.4  0.3 - 1.2 mg/dL Final    ALT 10/21/2020 17  11 - 66 U/L Final    WBC 10/21/2020 9.0  4.8 - 10.8 thou/mm3 Final    RBC 10/21/2020 4.97  4.20 - 5.40 mill/mm3 Final    Hemoglobin 10/21/2020 13.5  12.0 - 16.0 gm/dl Final    Hematocrit 10/21/2020 42.2  37.0 - 47.0 % Final    MCV 10/21/2020 84.9  81.0 - 99.0 fL Final    MCH 10/21/2020 27.2  26.0 - 33.0 pg Final    MCHC 10/21/2020 32.0* 32.2 - 35.5 gm/dl Final    RDW-CV 10/21/2020 13.7  11.5 - 14.5 % Final    RDW-SD 10/21/2020 42.5  35.0 - 45.0 fL Final    Platelets 11/07/0727 204  130 - 400 thou/mm3 Final    MPV 10/21/2020 11.7  9.4 - 12.4 fL Final    Seg Neutrophils 10/21/2020 63.6  % Final    Lymphocytes 10/21/2020 28.8  % Final    Monocytes 10/21/2020 4.8  % Final    Eosinophils 10/21/2020 1.9  % Final    Basophils 10/21/2020 0.6  % Final    Immature Granulocytes 10/21/2020 0.3  % Final    Segs Absolute 10/21/2020 5.7  1.8 - 7.7 thou/mm3 Final    Lymphocytes Absolute 10/21/2020 2.6  1.0 - 4.8 thou/mm3 Final    Monocytes Absolute 10/21/2020 0.4  0.4 - 1.3 thou/mm3 Final    Eosinophils Absolute 10/21/2020 0.2  0.0 - 0.4 thou/mm3 Final    Basophils Absolute 10/21/2020 0.1  0.0 - 0.1 thou/mm3 Final    Immature Grans (Abs) 10/21/2020 0.03  0.00 - 0.07 thou/mm3 Final    nRBC 10/21/2020 0  /100 wbc Final    Anion Gap 10/21/2020 12.0  8.0 - 16.0 meq/L Final    Est, Glom Filt Rate 10/21/2020 >90  ml/min/1.73m2 Final             ASSESSMENT & PLAN  1.  Numbness of toes; R lesser toes, 2nd digit the worst    W/u c/w L 5 radiculopathy  Discussed PT for back, declines  Will do HEP and may f/u with chiro  Monitor sxs, f/u any changes  May call if wants to do PT    2. Pedal edema    Resolved with stopping norvasc    - losartan (COZAAR) 50 MG tablet; Take 1 tablet by mouth daily  Dispense: 90 tablet; Refill: 3    3. Hypertension, essential    At goal  con't cozaar  Changed form norvasc d/t swelling    - losartan (COZAAR) 50 MG tablet; Take 1 tablet by mouth daily  Dispense: 90 tablet; Refill: 3    4. Encounter for screening mammogram for malignant neoplasm of breast    - TERESO KEANU DIGITAL SCREEN BILATERAL; Future    5. Needs flu shot    - INFLUENZA, QUADV, 3 YRS AND OLDER, IM PF, PREFILL SYR OR SDV, 0.5ML (AFLURIA QUADV, PF)      DISPOSITION    Return if symptoms worsen or fail to improve. [facility] [employer] released without restrictions. Future Appointments   Date Time Provider Junior Kim   1/12/2021  8:00 AM Cony Bunch DO [facility] [address]    Patient given educational materials on: See Attached    Barriers to learning and self management: none    Discussed use, benefit, and side effects of prescribed medications. Barriers to medication compliance addressed. All patient questions answered. Pt voiced understanding.        Electronically signed by Cony Bunch DO on 11/17/2020 at 8:39 AM The patient has been re-examined and I agree with the above assessment or I updated with my findings.

## 2021-02-24 DIAGNOSIS — Z82.49 FAMILY HISTORY OF ISCHEMIC HEART DISEASE AND OTHER DISEASES OF THE CIRCULATORY SYSTEM: ICD-10-CM

## 2021-02-24 DIAGNOSIS — Z87.898 PERSONAL HISTORY OF OTHER SPECIFIED CONDITIONS: ICD-10-CM

## 2021-02-24 DIAGNOSIS — Z86.39 PERSONAL HISTORY OF OTHER ENDOCRINE, NUTRITIONAL AND METABOLIC DISEASE: ICD-10-CM

## 2021-02-24 DIAGNOSIS — Z86.79 PERSONAL HISTORY OF OTHER DISEASES OF THE CIRCULATORY SYSTEM: ICD-10-CM

## 2021-02-24 DIAGNOSIS — Z87.39 PERSONAL HISTORY OF OTHER DISEASES OF THE MUSCULOSKELETAL SYSTEM AND CONNECTIVE TISSUE: ICD-10-CM

## 2021-02-24 DIAGNOSIS — I10 ESSENTIAL (PRIMARY) HYPERTENSION: ICD-10-CM

## 2021-02-24 DIAGNOSIS — I25.2 OLD MYOCARDIAL INFARCTION: ICD-10-CM

## 2021-02-24 DIAGNOSIS — E78.5 HYPERLIPIDEMIA, UNSPECIFIED: ICD-10-CM

## 2021-02-24 DIAGNOSIS — Z82.3 FAMILY HISTORY OF STROKE: ICD-10-CM

## 2021-02-24 RX ORDER — FLUTICASONE PROPIONATE 50 UG/1
50 SPRAY, METERED NASAL
Refills: 0 | Status: ACTIVE | COMMUNITY

## 2021-03-04 ENCOUNTER — APPOINTMENT (OUTPATIENT)
Dept: PULMONOLOGY | Facility: CLINIC | Age: 61
End: 2021-03-04
Payer: COMMERCIAL

## 2021-03-04 VITALS
RESPIRATION RATE: 14 BRPM | HEIGHT: 64 IN | SYSTOLIC BLOOD PRESSURE: 126 MMHG | OXYGEN SATURATION: 96 % | BODY MASS INDEX: 37.56 KG/M2 | DIASTOLIC BLOOD PRESSURE: 72 MMHG | HEART RATE: 87 BPM | WEIGHT: 220 LBS

## 2021-03-04 PROCEDURE — 99213 OFFICE O/P EST LOW 20 MIN: CPT

## 2021-03-04 PROCEDURE — 99072 ADDL SUPL MATRL&STAF TM PHE: CPT

## 2021-03-04 RX ORDER — TIOTROPIUM BROMIDE INHALATION SPRAY 3.12 UG/1
2.5 SPRAY, METERED RESPIRATORY (INHALATION)
Refills: 0 | Status: DISCONTINUED | COMMUNITY
End: 2021-03-04

## 2021-03-04 RX ORDER — FLUTICASONE FUROATE AND VILANTEROL TRIFENATATE 50; 25 UG/1; UG/1
POWDER RESPIRATORY (INHALATION)
Refills: 0 | Status: DISCONTINUED | COMMUNITY
End: 2021-03-04

## 2021-03-04 RX ORDER — FLUTICASONE FUROATE, UMECLIDINIUM BROMIDE AND VILANTEROL TRIFENATATE 200; 62.5; 25 UG/1; UG/1; UG/1
200-62.5-25 POWDER RESPIRATORY (INHALATION)
Qty: 1 | Refills: 3 | Status: ACTIVE | COMMUNITY
Start: 2021-03-04 | End: 1900-01-01

## 2021-03-04 RX ORDER — DOXYCYCLINE 100 MG/1
100 CAPSULE ORAL TWICE DAILY
Qty: 28 | Refills: 0 | Status: DISCONTINUED | COMMUNITY
Start: 2020-01-02 | End: 2021-03-04

## 2021-03-04 RX ORDER — MONTELUKAST SODIUM 4 MG/1
TABLET, CHEWABLE ORAL
Refills: 0 | Status: DISCONTINUED | COMMUNITY
End: 2021-03-04

## 2021-03-04 RX ORDER — PREDNISONE 20 MG/1
20 TABLET ORAL DAILY
Qty: 14 | Refills: 0 | Status: DISCONTINUED | COMMUNITY
Start: 2020-01-02 | End: 2021-03-04

## 2021-03-04 NOTE — HISTORY OF PRESENT ILLNESS
[Follow-Up - Routine Clinic] : a routine clinic follow-up of [Snoring] : snoring [Sleepy When Sedentary] : sleepy when sedentary [Currently Experiencing] : The patient is currently experiencing symptoms. [Good Sleep Hygiene] : good sleep hygiene [Severe Persistent] : severe persistent [Stable] : stable [Well Controlled] : Well controlled [Wheezing] : stable wheezing [Shortness Of Breath] : stable shortness of breath [Checks Regularly] : The patient checks ~his/her~ peak flow regularly [Good Control] : peak flow has been good [None] : None [Adherent] : the patient is adherent with ~his/her~ medication regimen [Goals--Doing Well] : the patient is doing well with ~his/her~ asthma goals [PFTs] : pulmonary function tests [Shortness of Breath] : Shortness of Breath

## 2021-03-24 ENCOUNTER — APPOINTMENT (OUTPATIENT)
Dept: PULMONOLOGY | Facility: CLINIC | Age: 61
End: 2021-03-24

## 2021-04-28 ENCOUNTER — APPOINTMENT (OUTPATIENT)
Age: 61
End: 2021-04-28
Payer: COMMERCIAL

## 2021-04-28 VITALS
DIASTOLIC BLOOD PRESSURE: 76 MMHG | HEIGHT: 64 IN | BODY MASS INDEX: 37.39 KG/M2 | RESPIRATION RATE: 14 BRPM | WEIGHT: 219 LBS | SYSTOLIC BLOOD PRESSURE: 136 MMHG | OXYGEN SATURATION: 96 % | HEART RATE: 100 BPM

## 2021-04-28 DIAGNOSIS — R06.02 SHORTNESS OF BREATH: ICD-10-CM

## 2021-04-28 PROCEDURE — 99072 ADDL SUPL MATRL&STAF TM PHE: CPT

## 2021-04-28 PROCEDURE — 95117 IMMUNOTHERAPY INJECTIONS: CPT

## 2021-04-28 PROCEDURE — 99213 OFFICE O/P EST LOW 20 MIN: CPT | Mod: 25

## 2021-05-24 ENCOUNTER — APPOINTMENT (OUTPATIENT)
Age: 61
End: 2021-05-24

## 2021-05-24 ENCOUNTER — APPOINTMENT (OUTPATIENT)
Age: 61
End: 2021-05-24
Payer: COMMERCIAL

## 2021-05-24 VITALS
SYSTOLIC BLOOD PRESSURE: 134 MMHG | HEIGHT: 64 IN | OXYGEN SATURATION: 97 % | WEIGHT: 219 LBS | DIASTOLIC BLOOD PRESSURE: 80 MMHG | RESPIRATION RATE: 14 BRPM | HEART RATE: 88 BPM | BODY MASS INDEX: 37.39 KG/M2

## 2021-05-24 PROCEDURE — 99213 OFFICE O/P EST LOW 20 MIN: CPT | Mod: 25

## 2021-05-24 PROCEDURE — 99072 ADDL SUPL MATRL&STAF TM PHE: CPT

## 2021-06-03 ENCOUNTER — APPOINTMENT (OUTPATIENT)
Dept: PULMONOLOGY | Facility: CLINIC | Age: 61
End: 2021-06-03

## 2021-06-10 ENCOUNTER — APPOINTMENT (OUTPATIENT)
Age: 61
End: 2021-06-10
Payer: COMMERCIAL

## 2021-06-10 VITALS
OXYGEN SATURATION: 97 % | HEIGHT: 64 IN | DIASTOLIC BLOOD PRESSURE: 80 MMHG | BODY MASS INDEX: 37.39 KG/M2 | SYSTOLIC BLOOD PRESSURE: 122 MMHG | RESPIRATION RATE: 14 BRPM | WEIGHT: 219 LBS | HEART RATE: 84 BPM

## 2021-06-10 PROCEDURE — 99072 ADDL SUPL MATRL&STAF TM PHE: CPT

## 2021-06-10 PROCEDURE — 95117 IMMUNOTHERAPY INJECTIONS: CPT

## 2021-06-30 ENCOUNTER — APPOINTMENT (OUTPATIENT)
Age: 61
End: 2021-06-30
Payer: COMMERCIAL

## 2021-06-30 VITALS
WEIGHT: 219 LBS | BODY MASS INDEX: 37.39 KG/M2 | DIASTOLIC BLOOD PRESSURE: 74 MMHG | SYSTOLIC BLOOD PRESSURE: 130 MMHG | HEIGHT: 64 IN | RESPIRATION RATE: 14 BRPM | OXYGEN SATURATION: 96 % | HEART RATE: 93 BPM

## 2021-06-30 PROCEDURE — 99072 ADDL SUPL MATRL&STAF TM PHE: CPT

## 2021-06-30 PROCEDURE — 95117 IMMUNOTHERAPY INJECTIONS: CPT

## 2021-07-08 ENCOUNTER — NON-APPOINTMENT (OUTPATIENT)
Age: 61
End: 2021-07-08

## 2021-07-15 ENCOUNTER — APPOINTMENT (OUTPATIENT)
Age: 61
End: 2021-07-15
Payer: COMMERCIAL

## 2021-07-15 VITALS
HEART RATE: 101 BPM | DIASTOLIC BLOOD PRESSURE: 60 MMHG | WEIGHT: 231 LBS | HEIGHT: 64 IN | RESPIRATION RATE: 12 BRPM | BODY MASS INDEX: 39.44 KG/M2 | OXYGEN SATURATION: 97 % | SYSTOLIC BLOOD PRESSURE: 140 MMHG

## 2021-07-15 PROCEDURE — 95117 IMMUNOTHERAPY INJECTIONS: CPT

## 2021-07-15 PROCEDURE — 99072 ADDL SUPL MATRL&STAF TM PHE: CPT

## 2021-07-19 NOTE — ASU PREOP CHECKLIST - HAND OFF
CONSULTATION   Chief complaint: Patient presents with:  Hernia: Referred by Dr. Marina Patel for sometimes painful LUQ lump. HPI: Aakash Standard is experiencing considerable pain and bulging of the LUQ abdominal wall and LUQ abdomen.   1-2 weeks  No allevi Subcutaneous Solution Pen-injector Inject 0.5 mg into the skin once a week.  1.5 mL 3   • Omeprazole 40 MG Oral Capsule Delayed Release TAKE 1 CAPSULE BY MOUTH DAILY 30 TO 60 MINUTES BEFORE A MEAL 90 capsule 1   • escitalopram 20 MG Oral Tab TAKE 1/2 TABLET Inhaler 1   • Insulin Aspart Pen (NOVOLOG FLEXPEN) 100 UNIT/ML Subcutaneous Solution Pen-injector INJECT 88 UNITS UNDER THE SKIN MAXIMUM THREE TIMES DAILY AT MEAL TIMES AS DIRECTED WITH BASE DOSE AND SLIDING SCALE (Patient taking differently: INJECT 35 UNI Smoking status: Former Smoker        Packs/day: 0.20        Years: 1.00        Pack years: .2        Quit date: 2013        Years since quittin.5      Smokeless tobacco: Never Used    Vaping Use      Vaping Use: Never used    Substance and Sexual soft, slight -tenderness on palp of LUQ, non-distended, no organomegaly noted, no masses, no definite hernias, no ascites. Extremities: no edema, cyanosis, or clubbing. No deformity. Musculoskeletal: normal appearance, no deformities, normal function.  Ba yes

## 2021-07-29 ENCOUNTER — APPOINTMENT (OUTPATIENT)
Age: 61
End: 2021-07-29
Payer: COMMERCIAL

## 2021-07-29 VITALS
HEART RATE: 98 BPM | DIASTOLIC BLOOD PRESSURE: 80 MMHG | RESPIRATION RATE: 12 BRPM | HEIGHT: 64 IN | SYSTOLIC BLOOD PRESSURE: 140 MMHG | BODY MASS INDEX: 39.61 KG/M2 | WEIGHT: 232 LBS | OXYGEN SATURATION: 98 %

## 2021-07-29 PROCEDURE — 95117 IMMUNOTHERAPY INJECTIONS: CPT

## 2021-08-12 ENCOUNTER — APPOINTMENT (OUTPATIENT)
Age: 61
End: 2021-08-12
Payer: COMMERCIAL

## 2021-08-12 VITALS
RESPIRATION RATE: 14 BRPM | HEART RATE: 110 BPM | BODY MASS INDEX: 39.09 KG/M2 | DIASTOLIC BLOOD PRESSURE: 80 MMHG | WEIGHT: 229 LBS | SYSTOLIC BLOOD PRESSURE: 128 MMHG | OXYGEN SATURATION: 98 % | HEIGHT: 64 IN

## 2021-08-12 PROCEDURE — 95117 IMMUNOTHERAPY INJECTIONS: CPT

## 2021-08-26 ENCOUNTER — APPOINTMENT (OUTPATIENT)
Age: 61
End: 2021-08-26
Payer: COMMERCIAL

## 2021-08-26 VITALS
WEIGHT: 227 LBS | HEART RATE: 100 BPM | HEIGHT: 64 IN | BODY MASS INDEX: 38.76 KG/M2 | OXYGEN SATURATION: 97 % | SYSTOLIC BLOOD PRESSURE: 140 MMHG | DIASTOLIC BLOOD PRESSURE: 70 MMHG | RESPIRATION RATE: 12 BRPM

## 2021-08-26 PROCEDURE — 95117 IMMUNOTHERAPY INJECTIONS: CPT

## 2021-08-26 RX ORDER — ALBUTEROL SULFATE 90 UG/1
108 (90 BASE) AEROSOL, METERED RESPIRATORY (INHALATION)
Qty: 1 | Refills: 3 | Status: ACTIVE | COMMUNITY
Start: 1900-01-01 | End: 1900-01-01

## 2021-09-07 ENCOUNTER — RX RENEWAL (OUTPATIENT)
Age: 61
End: 2021-09-07

## 2021-09-09 ENCOUNTER — APPOINTMENT (OUTPATIENT)
Age: 61
End: 2021-09-09
Payer: COMMERCIAL

## 2021-09-09 VITALS
DIASTOLIC BLOOD PRESSURE: 70 MMHG | OXYGEN SATURATION: 97 % | BODY MASS INDEX: 38.76 KG/M2 | RESPIRATION RATE: 12 BRPM | SYSTOLIC BLOOD PRESSURE: 120 MMHG | HEART RATE: 97 BPM | HEIGHT: 64 IN | WEIGHT: 227 LBS

## 2021-09-09 PROCEDURE — 95117 IMMUNOTHERAPY INJECTIONS: CPT

## 2021-09-10 ENCOUNTER — RX RENEWAL (OUTPATIENT)
Age: 61
End: 2021-09-10

## 2021-09-23 ENCOUNTER — RX RENEWAL (OUTPATIENT)
Age: 61
End: 2021-09-23

## 2021-10-14 ENCOUNTER — APPOINTMENT (OUTPATIENT)
Age: 61
End: 2021-10-14
Payer: COMMERCIAL

## 2021-10-14 VITALS
HEART RATE: 97 BPM | OXYGEN SATURATION: 96 % | WEIGHT: 229 LBS | HEIGHT: 64 IN | RESPIRATION RATE: 12 BRPM | DIASTOLIC BLOOD PRESSURE: 70 MMHG | SYSTOLIC BLOOD PRESSURE: 120 MMHG | BODY MASS INDEX: 39.09 KG/M2

## 2021-10-14 PROCEDURE — 95117 IMMUNOTHERAPY INJECTIONS: CPT

## 2021-11-04 ENCOUNTER — APPOINTMENT (OUTPATIENT)
Age: 61
End: 2021-11-04
Payer: COMMERCIAL

## 2021-11-04 VITALS
OXYGEN SATURATION: 96 % | HEART RATE: 82 BPM | WEIGHT: 232 LBS | RESPIRATION RATE: 12 BRPM | DIASTOLIC BLOOD PRESSURE: 80 MMHG | HEIGHT: 64 IN | SYSTOLIC BLOOD PRESSURE: 140 MMHG | BODY MASS INDEX: 39.61 KG/M2

## 2021-11-04 PROCEDURE — 95117 IMMUNOTHERAPY INJECTIONS: CPT

## 2021-11-24 ENCOUNTER — APPOINTMENT (OUTPATIENT)
Age: 61
End: 2021-11-24
Payer: COMMERCIAL

## 2021-11-24 VITALS
HEART RATE: 76 BPM | RESPIRATION RATE: 14 BRPM | OXYGEN SATURATION: 93 % | DIASTOLIC BLOOD PRESSURE: 76 MMHG | BODY MASS INDEX: 38.41 KG/M2 | WEIGHT: 225 LBS | SYSTOLIC BLOOD PRESSURE: 130 MMHG | HEIGHT: 64 IN

## 2021-11-24 PROCEDURE — 95117 IMMUNOTHERAPY INJECTIONS: CPT

## 2021-12-09 ENCOUNTER — APPOINTMENT (OUTPATIENT)
Age: 61
End: 2021-12-09
Payer: COMMERCIAL

## 2021-12-09 VITALS
DIASTOLIC BLOOD PRESSURE: 80 MMHG | BODY MASS INDEX: 40.29 KG/M2 | HEIGHT: 64 IN | HEART RATE: 89 BPM | RESPIRATION RATE: 12 BRPM | OXYGEN SATURATION: 98 % | WEIGHT: 236 LBS | SYSTOLIC BLOOD PRESSURE: 140 MMHG

## 2021-12-09 PROCEDURE — 95117 IMMUNOTHERAPY INJECTIONS: CPT

## 2021-12-16 NOTE — H&P PST ADULT - AS BP NONINV METHOD
Detail Level: Detailed Render Note In Bullet Format When Appropriate: No Consent: The patient's verbal consent was obtained including but not limited to risks of crusting, scabbing, blistering, scarring, darker or lighter pigmentary change, recurrence, incomplete removal and infection. Show Applicator Variable?: Yes Medical Necessity Information: It is in your best interest to select a reason for this procedure from the list below. All of these items fulfill various CMS LCD requirements except the new and changing color options. Medical Necessity Clause: This procedure was medically necessary because the lesions that were treated were: Post-Care Instructions: I reviewed with the patient in detail post-care instructions. Patient is to wear sunprotection, and avoid picking at any of the treated lesions. Pt may apply Vaseline to crusted or scabbing areas. auscultated w/ stethoscope

## 2021-12-22 NOTE — PATIENT PROFILE ADULT - BRADEN SENSORY
Vaccine Information Statement(s) or the Emergency Use Authorization was given today. This has been reviewed, questions answered, and verbal consent given by Patient for injection(s) and administration of Shingles.      Patient tolerated without incident. See immunization grid for documentation.   (2) very limited

## 2022-01-13 ENCOUNTER — APPOINTMENT (OUTPATIENT)
Age: 62
End: 2022-01-13
Payer: COMMERCIAL

## 2022-01-13 VITALS
RESPIRATION RATE: 12 BRPM | SYSTOLIC BLOOD PRESSURE: 120 MMHG | HEIGHT: 64 IN | BODY MASS INDEX: 40.29 KG/M2 | OXYGEN SATURATION: 96 % | HEART RATE: 101 BPM | WEIGHT: 236 LBS | DIASTOLIC BLOOD PRESSURE: 70 MMHG

## 2022-01-13 PROCEDURE — 95117 IMMUNOTHERAPY INJECTIONS: CPT

## 2022-01-26 ENCOUNTER — APPOINTMENT (OUTPATIENT)
Age: 62
End: 2022-01-26
Payer: COMMERCIAL

## 2022-01-26 VITALS
SYSTOLIC BLOOD PRESSURE: 118 MMHG | BODY MASS INDEX: 38.41 KG/M2 | OXYGEN SATURATION: 95 % | HEIGHT: 64 IN | WEIGHT: 225 LBS | DIASTOLIC BLOOD PRESSURE: 72 MMHG | RESPIRATION RATE: 14 BRPM | HEART RATE: 96 BPM

## 2022-01-26 PROCEDURE — 95117 IMMUNOTHERAPY INJECTIONS: CPT

## 2022-01-26 RX ORDER — ALBUTEROL SULFATE 2.5 MG/3ML
(2.5 MG/3ML) SOLUTION RESPIRATORY (INHALATION)
Qty: 4 | Refills: 3 | Status: ACTIVE | COMMUNITY
Start: 1900-01-01 | End: 1900-01-01

## 2022-02-01 NOTE — ED PROVIDER NOTE - CRITICAL CARE PROVIDED
Impression: Peripheral pterygium, stationary, right eye: H11.041. Plan: Discussed with patient, understands this may limit vision after surgery. direct patient care (not related to procedure)/documentation/consult w/ pt's family directly relating to pts condition/interpretation of diagnostic studies/additional history taking/consultation with other physicians

## 2022-02-16 ENCOUNTER — APPOINTMENT (OUTPATIENT)
Age: 62
End: 2022-02-16

## 2022-03-02 ENCOUNTER — APPOINTMENT (OUTPATIENT)
Age: 62
End: 2022-03-02
Payer: COMMERCIAL

## 2022-03-02 VITALS
OXYGEN SATURATION: 98 % | DIASTOLIC BLOOD PRESSURE: 86 MMHG | WEIGHT: 229 LBS | RESPIRATION RATE: 14 BRPM | BODY MASS INDEX: 39.09 KG/M2 | HEIGHT: 64 IN | SYSTOLIC BLOOD PRESSURE: 126 MMHG | HEART RATE: 79 BPM

## 2022-03-02 PROCEDURE — 95117 IMMUNOTHERAPY INJECTIONS: CPT

## 2022-03-16 ENCOUNTER — APPOINTMENT (OUTPATIENT)
Age: 62
End: 2022-03-16
Payer: COMMERCIAL

## 2022-03-16 VITALS
HEART RATE: 80 BPM | RESPIRATION RATE: 12 BRPM | WEIGHT: 228 LBS | SYSTOLIC BLOOD PRESSURE: 120 MMHG | BODY MASS INDEX: 38.93 KG/M2 | OXYGEN SATURATION: 97 % | DIASTOLIC BLOOD PRESSURE: 80 MMHG | HEIGHT: 64 IN

## 2022-03-16 PROCEDURE — 95117 IMMUNOTHERAPY INJECTIONS: CPT

## 2022-04-06 ENCOUNTER — APPOINTMENT (OUTPATIENT)
Age: 62
End: 2022-04-06
Payer: COMMERCIAL

## 2022-04-06 VITALS
SYSTOLIC BLOOD PRESSURE: 122 MMHG | BODY MASS INDEX: 38.93 KG/M2 | HEART RATE: 89 BPM | DIASTOLIC BLOOD PRESSURE: 80 MMHG | OXYGEN SATURATION: 98 % | WEIGHT: 228 LBS | RESPIRATION RATE: 12 BRPM | HEIGHT: 64 IN

## 2022-04-06 PROCEDURE — 95117 IMMUNOTHERAPY INJECTIONS: CPT

## 2022-04-20 ENCOUNTER — APPOINTMENT (OUTPATIENT)
Age: 62
End: 2022-04-20
Payer: COMMERCIAL

## 2022-04-20 VITALS
HEIGHT: 64 IN | WEIGHT: 228 LBS | HEART RATE: 103 BPM | BODY MASS INDEX: 38.93 KG/M2 | RESPIRATION RATE: 14 BRPM | OXYGEN SATURATION: 98 % | DIASTOLIC BLOOD PRESSURE: 80 MMHG | SYSTOLIC BLOOD PRESSURE: 120 MMHG

## 2022-04-20 PROCEDURE — 95117 IMMUNOTHERAPY INJECTIONS: CPT

## 2022-05-16 ENCOUNTER — APPOINTMENT (OUTPATIENT)
Age: 62
End: 2022-05-16
Payer: COMMERCIAL

## 2022-05-16 VITALS
DIASTOLIC BLOOD PRESSURE: 70 MMHG | SYSTOLIC BLOOD PRESSURE: 118 MMHG | HEIGHT: 64 IN | OXYGEN SATURATION: 98 % | WEIGHT: 220 LBS | HEART RATE: 91 BPM | BODY MASS INDEX: 37.56 KG/M2 | RESPIRATION RATE: 14 BRPM

## 2022-05-16 PROCEDURE — 95117 IMMUNOTHERAPY INJECTIONS: CPT

## 2022-06-01 ENCOUNTER — APPOINTMENT (OUTPATIENT)
Age: 62
End: 2022-06-01

## 2022-07-11 ENCOUNTER — APPOINTMENT (OUTPATIENT)
Age: 62
End: 2022-07-11

## 2022-07-11 VITALS
HEART RATE: 104 BPM | RESPIRATION RATE: 14 BRPM | BODY MASS INDEX: 37.56 KG/M2 | DIASTOLIC BLOOD PRESSURE: 80 MMHG | SYSTOLIC BLOOD PRESSURE: 124 MMHG | OXYGEN SATURATION: 98 % | HEIGHT: 64 IN | WEIGHT: 220 LBS

## 2022-07-11 PROCEDURE — 95117 IMMUNOTHERAPY INJECTIONS: CPT

## 2022-08-03 ENCOUNTER — APPOINTMENT (OUTPATIENT)
Age: 62
End: 2022-08-03

## 2022-08-03 VITALS
BODY MASS INDEX: 38.76 KG/M2 | RESPIRATION RATE: 14 BRPM | SYSTOLIC BLOOD PRESSURE: 114 MMHG | HEART RATE: 99 BPM | HEIGHT: 64 IN | OXYGEN SATURATION: 97 % | WEIGHT: 227 LBS | DIASTOLIC BLOOD PRESSURE: 70 MMHG

## 2022-08-03 PROCEDURE — 99214 OFFICE O/P EST MOD 30 MIN: CPT | Mod: 25

## 2022-08-03 PROCEDURE — 95117 IMMUNOTHERAPY INJECTIONS: CPT

## 2022-08-03 RX ORDER — TIOTROPIUM BROMIDE INHALATION SPRAY 1.56 UG/1
1.25 SPRAY, METERED RESPIRATORY (INHALATION) DAILY
Qty: 1 | Refills: 5 | Status: ACTIVE | COMMUNITY
Start: 2022-08-03 | End: 1900-01-01

## 2022-08-03 NOTE — HISTORY OF PRESENT ILLNESS
[Follow-Up - Routine Clinic] : a routine clinic follow-up of [Snoring] : snoring [Sleepy When Sedentary] : sleepy when sedentary [Currently Experiencing] : The patient is currently experiencing symptoms. [Good Sleep Hygiene] : good sleep hygiene [Severe Persistent] : severe persistent [Stable] : stable [Well Controlled] : Well controlled [Wheezing] : resolved wheezing [Shortness Of Breath] : improved shortness of breath [Checks Regularly] : The patient checks ~his/her~ peak flow regularly [Good Control] : peak flow has been good [None] : None [Adherent] : the patient is adherent with ~his/her~ medication regimen [Goals--Doing Well] : the patient is doing well with ~his/her~ asthma goals [PFTs] : pulmonary function tests [Shortness of Breath] : Shortness of Breath

## 2022-08-23 ENCOUNTER — APPOINTMENT (OUTPATIENT)
Age: 62
End: 2022-08-23

## 2022-08-23 VITALS
BODY MASS INDEX: 38.41 KG/M2 | HEIGHT: 64 IN | OXYGEN SATURATION: 98 % | SYSTOLIC BLOOD PRESSURE: 124 MMHG | WEIGHT: 225 LBS | RESPIRATION RATE: 14 BRPM | DIASTOLIC BLOOD PRESSURE: 82 MMHG | HEART RATE: 95 BPM

## 2022-08-23 PROCEDURE — 95117 IMMUNOTHERAPY INJECTIONS: CPT

## 2022-09-08 ENCOUNTER — APPOINTMENT (OUTPATIENT)
Age: 62
End: 2022-09-08

## 2022-09-08 VITALS
HEIGHT: 64 IN | DIASTOLIC BLOOD PRESSURE: 74 MMHG | SYSTOLIC BLOOD PRESSURE: 112 MMHG | HEART RATE: 99 BPM | RESPIRATION RATE: 14 BRPM | WEIGHT: 225 LBS | OXYGEN SATURATION: 98 % | BODY MASS INDEX: 38.41 KG/M2

## 2022-09-08 PROCEDURE — 95117 IMMUNOTHERAPY INJECTIONS: CPT

## 2022-09-26 ENCOUNTER — APPOINTMENT (OUTPATIENT)
Age: 62
End: 2022-09-26

## 2022-09-26 VITALS
HEART RATE: 93 BPM | OXYGEN SATURATION: 99 % | WEIGHT: 225 LBS | HEIGHT: 64 IN | RESPIRATION RATE: 14 BRPM | DIASTOLIC BLOOD PRESSURE: 80 MMHG | BODY MASS INDEX: 38.41 KG/M2 | SYSTOLIC BLOOD PRESSURE: 114 MMHG

## 2022-09-26 PROCEDURE — 95117 IMMUNOTHERAPY INJECTIONS: CPT

## 2022-10-12 ENCOUNTER — APPOINTMENT (OUTPATIENT)
Age: 62
End: 2022-10-12

## 2022-10-12 VITALS
WEIGHT: 225 LBS | DIASTOLIC BLOOD PRESSURE: 84 MMHG | RESPIRATION RATE: 14 BRPM | OXYGEN SATURATION: 97 % | HEIGHT: 64 IN | HEART RATE: 84 BPM | SYSTOLIC BLOOD PRESSURE: 126 MMHG | BODY MASS INDEX: 38.41 KG/M2

## 2022-10-12 PROCEDURE — 95117 IMMUNOTHERAPY INJECTIONS: CPT

## 2022-10-20 ENCOUNTER — APPOINTMENT (OUTPATIENT)
Dept: OTOLARYNGOLOGY | Facility: CLINIC | Age: 62
End: 2022-10-20

## 2022-10-25 ENCOUNTER — RX RENEWAL (OUTPATIENT)
Age: 62
End: 2022-10-25

## 2022-11-07 ENCOUNTER — RX RENEWAL (OUTPATIENT)
Age: 62
End: 2022-11-07

## 2022-11-16 ENCOUNTER — APPOINTMENT (OUTPATIENT)
Age: 62
End: 2022-11-16

## 2022-11-16 VITALS
WEIGHT: 225 LBS | BODY MASS INDEX: 38.41 KG/M2 | HEART RATE: 93 BPM | OXYGEN SATURATION: 97 % | DIASTOLIC BLOOD PRESSURE: 80 MMHG | SYSTOLIC BLOOD PRESSURE: 126 MMHG | HEIGHT: 64 IN | RESPIRATION RATE: 14 BRPM

## 2022-11-16 PROCEDURE — 95117 IMMUNOTHERAPY INJECTIONS: CPT

## 2022-11-23 NOTE — ASU PATIENT PROFILE, ADULT - TEACHING/LEARNING EDUCATIONAL LEVEL
PA for Atorvastatin 40mg, 1.5 tablet QD, approved via [employer].  1/1/2022-12/31/2022  
[facility]

## 2022-12-08 ENCOUNTER — APPOINTMENT (OUTPATIENT)
Age: 62
End: 2022-12-08

## 2022-12-08 PROCEDURE — 95117 IMMUNOTHERAPY INJECTIONS: CPT

## 2022-12-30 ENCOUNTER — APPOINTMENT (OUTPATIENT)
Age: 62
End: 2022-12-30
Payer: COMMERCIAL

## 2022-12-30 VITALS
HEIGHT: 64 IN | SYSTOLIC BLOOD PRESSURE: 140 MMHG | WEIGHT: 225 LBS | DIASTOLIC BLOOD PRESSURE: 60 MMHG | HEART RATE: 72 BPM | OXYGEN SATURATION: 100 % | RESPIRATION RATE: 12 BRPM | BODY MASS INDEX: 38.41 KG/M2

## 2022-12-30 PROCEDURE — 95117 IMMUNOTHERAPY INJECTIONS: CPT

## 2023-01-10 ENCOUNTER — NON-APPOINTMENT (OUTPATIENT)
Age: 63
End: 2023-01-10

## 2023-01-12 ENCOUNTER — APPOINTMENT (OUTPATIENT)
Age: 63
End: 2023-01-12

## 2023-02-03 ENCOUNTER — RX RENEWAL (OUTPATIENT)
Age: 63
End: 2023-02-03

## 2023-02-09 ENCOUNTER — APPOINTMENT (OUTPATIENT)
Dept: OTOLARYNGOLOGY | Facility: CLINIC | Age: 63
End: 2023-02-09
Payer: COMMERCIAL

## 2023-02-09 ENCOUNTER — APPOINTMENT (OUTPATIENT)
Dept: PULMONOLOGY | Facility: CLINIC | Age: 63
End: 2023-02-09
Payer: COMMERCIAL

## 2023-02-09 VITALS
OXYGEN SATURATION: 98 % | HEART RATE: 72 BPM | RESPIRATION RATE: 12 BRPM | WEIGHT: 225 LBS | HEIGHT: 64 IN | SYSTOLIC BLOOD PRESSURE: 120 MMHG | DIASTOLIC BLOOD PRESSURE: 70 MMHG | BODY MASS INDEX: 38.41 KG/M2

## 2023-02-09 VITALS — BODY MASS INDEX: 38.41 KG/M2 | HEIGHT: 64 IN | WEIGHT: 225 LBS

## 2023-02-09 DIAGNOSIS — H60.60 UNSPECIFIED CHRONIC OTITIS EXTERNA, UNSPECIFIED EAR: ICD-10-CM

## 2023-02-09 DIAGNOSIS — H65.90 UNSPECIFIED NONSUPPURATIVE OTITIS MEDIA, UNSPECIFIED EAR: ICD-10-CM

## 2023-02-09 PROCEDURE — 95117 IMMUNOTHERAPY INJECTIONS: CPT

## 2023-02-09 PROCEDURE — 99204 OFFICE O/P NEW MOD 45 MIN: CPT | Mod: 25

## 2023-02-09 PROCEDURE — 31231 NASAL ENDOSCOPY DX: CPT

## 2023-02-09 RX ORDER — OFLOXACIN OTIC 3 MG/ML
0.3 SOLUTION AURICULAR (OTIC) TWICE DAILY
Qty: 1 | Refills: 2 | Status: ACTIVE | COMMUNITY
Start: 2023-02-09 | End: 1900-01-01

## 2023-02-09 NOTE — HISTORY OF PRESENT ILLNESS
[FreeTextEntry1] : Patient presents today c/o sinusitis, loss of smell,  history of nasal polyps, tinnitus in left ear . Patient states she can not smell and she thinks her polyps .  She also has a sharp pain in her left ear when she blows here nose. Pt is on Xolair with pulmonary. Pt has severe symptoms. Pulmonary notes reviewed.

## 2023-02-09 NOTE — REASON FOR VISIT
[Subsequent Evaluation] : a subsequent evaluation for [FreeTextEntry2] : sinusitis, loss of smell,  history of nasal polyps, tinnitus in left ear

## 2023-02-09 NOTE — PROCEDURE
[Topical Lidocaine] : topical lidocaine [Oxymetazoline HCl] : oxymetazoline HCl [Rigid Endoscope] : examined with a rigid endoscope [Congested] : congested [Gelacio] : gelacio [___ cm] : [unfilled]Ucm polyp(s) on the right [de-identified] : polyps

## 2023-02-09 NOTE — PHYSICAL EXAM
[Nasal Endoscopy Performed] : nasal endoscopy was performed, see procedure section for findings [Normal] : mucosa is normal [Midline] : trachea located in midline position [de-identified] : cerumen left

## 2023-02-22 NOTE — H&P PST ADULT - FUNCTIONAL SCREEN CURRENT LEVEL: TOILETING, MLM
(0) independent Eucrisa Counseling: Patient may experience a mild burning sensation during topical application. Eucrisa is not approved in children less than 3 months of age.

## 2023-02-23 ENCOUNTER — RESULT REVIEW (OUTPATIENT)
Age: 63
End: 2023-02-23

## 2023-02-23 ENCOUNTER — OUTPATIENT (OUTPATIENT)
Dept: OUTPATIENT SERVICES | Facility: HOSPITAL | Age: 63
LOS: 1 days | End: 2023-02-23
Payer: COMMERCIAL

## 2023-02-23 ENCOUNTER — APPOINTMENT (OUTPATIENT)
Dept: PULMONOLOGY | Facility: CLINIC | Age: 63
End: 2023-02-23
Payer: COMMERCIAL

## 2023-02-23 VITALS
OXYGEN SATURATION: 97 % | HEIGHT: 64 IN | DIASTOLIC BLOOD PRESSURE: 60 MMHG | SYSTOLIC BLOOD PRESSURE: 110 MMHG | BODY MASS INDEX: 38.41 KG/M2 | HEART RATE: 96 BPM | RESPIRATION RATE: 12 BRPM | WEIGHT: 225 LBS

## 2023-02-23 DIAGNOSIS — J33.9 NASAL POLYP, UNSPECIFIED: Chronic | ICD-10-CM

## 2023-02-23 DIAGNOSIS — J33.9 NASAL POLYP, UNSPECIFIED: ICD-10-CM

## 2023-02-23 DIAGNOSIS — Z90.49 ACQUIRED ABSENCE OF OTHER SPECIFIED PARTS OF DIGESTIVE TRACT: Chronic | ICD-10-CM

## 2023-02-23 DIAGNOSIS — Z00.8 ENCOUNTER FOR OTHER GENERAL EXAMINATION: ICD-10-CM

## 2023-02-23 PROCEDURE — 70486 CT MAXILLOFACIAL W/O DYE: CPT | Mod: 26

## 2023-02-23 PROCEDURE — 70486 CT MAXILLOFACIAL W/O DYE: CPT

## 2023-02-23 PROCEDURE — 99213 OFFICE O/P EST LOW 20 MIN: CPT | Mod: 25

## 2023-02-23 PROCEDURE — 95117 IMMUNOTHERAPY INJECTIONS: CPT

## 2023-02-24 DIAGNOSIS — J33.9 NASAL POLYP, UNSPECIFIED: ICD-10-CM

## 2023-03-15 ENCOUNTER — APPOINTMENT (OUTPATIENT)
Dept: OTOLARYNGOLOGY | Facility: CLINIC | Age: 63
End: 2023-03-15
Payer: COMMERCIAL

## 2023-03-15 ENCOUNTER — APPOINTMENT (OUTPATIENT)
Dept: PULMONOLOGY | Facility: CLINIC | Age: 63
End: 2023-03-15

## 2023-03-15 ENCOUNTER — TRANSCRIPTION ENCOUNTER (OUTPATIENT)
Age: 63
End: 2023-03-15

## 2023-03-15 DIAGNOSIS — J33.9 NASAL POLYP, UNSPECIFIED: ICD-10-CM

## 2023-03-15 DIAGNOSIS — J32.9 CHRONIC SINUSITIS, UNSPECIFIED: ICD-10-CM

## 2023-03-15 PROCEDURE — 31231 NASAL ENDOSCOPY DX: CPT

## 2023-03-15 PROCEDURE — 99215 OFFICE O/P EST HI 40 MIN: CPT | Mod: 25

## 2023-03-15 NOTE — REASON FOR VISIT
[Subsequent Evaluation] : a subsequent evaluation for [FreeTextEntry2] :  nasal polyposis , middle ear effusion and  chronic otitis externa

## 2023-03-15 NOTE — DATA REVIEWED
[de-identified] : Test was reviewed  and interpreted by me. See official report below.\par PROCEDURE DATE:  02/23/2023\par \par \par \par INTERPRETATION:  Clinical History / Reason for exam: Nasal polyps\par \par Technique: CT of the paranasal sinuses without contrast.  Contiguous unenhanced CT axial images of the paranasal sinuses with coronal and sagittal reformats.\par \par Correlation with CT neck dated 12/25/2018.\par \par Findings:\par \par There is extensive mucosal disease throughout the sinonasal cavity with mixed density secretions. Findings are suggestive of sinonasal polyposis.\par \par The sino-cranial and sino-orbital junctions are intact including the lamina papyracea and cribriform plates.\par \par Nasal cavity: Extensive opacification bilaterally with thinning or dehiscence of the bony structures. Mild deviation of the osseous nasal septum to the right.\par \par Frontal sinuses: Well-developed and completely opacified bilaterally. The outflow tracts are opacified.\par \par Ethmoid sinuses: Completely opacified bilaterally with hyperdense contents. Thinning or dehiscence of the osseous septations.\par \par Sphenoid sinuses: Well-developed with mild mucosal thickening on the right and moderate to severe mucosal thickening on the left. The sphenoethmoidal recesses are opacified.\par \par Maxillary sinuses: Well-developed with extensive opacification bilaterally. The ostiomeatal complexes are opacified.\par \par There are severe degenerative changes of the right temporomandibular joint.\par \par IMPRESSION:\par \par Extensive opacification of the sinonasal cavity with mixed density secretions and bony thinning especially involving the nasal cavity and ethmoid sinuses. The pattern is suggestive of sinonasal polyposis.\par \par

## 2023-03-15 NOTE — HISTORY OF PRESENT ILLNESS
[FreeTextEntry1] : Patient returns today following up on  nasal polyposis , middle ear effusion and  chronic otitis externa . She had CT  performed , here to discuss results . She is still experiencing sharp pain in left ear when ear doesn't  pop , not hearing well from ear. She has been using ofloxacin drop inconsistently . Pt has nsaids allergy.

## 2023-03-15 NOTE — PROCEDURE
[None] : none [Rigid Endoscope] : examined with a rigid endoscope [___ cm] : bilateral [unfilled]Ucm polyp(s) [Normal] : the paranasal sinuses had no abnormalities [de-identified] : nasal polyps

## 2023-03-15 NOTE — PHYSICAL EXAM
[Nasal Endoscopy Performed] : nasal endoscopy was performed, see procedure section for findings [Normal] : mucosa is normal [Midline] : trachea located in midline position [de-identified] : edema

## 2023-03-15 NOTE — ASSESSMENT
[FreeTextEntry1] : I have reviewed the risk and benefits of maxillary antrostomy with removal of contents, total ethmoidectomy, sphenoidotomy with removal of contents, frontal sinusotomy with removal of contents, and CT navigation with the patient. They include but are not limited to bleeding, infection, recurrent symptoms and return to OR, change in taste and smell, injury to the orbit and brain, CSF leak, and need for postoperative treatment. \par \par

## 2023-04-13 ENCOUNTER — APPOINTMENT (OUTPATIENT)
Dept: PULMONOLOGY | Facility: CLINIC | Age: 63
End: 2023-04-13
Payer: COMMERCIAL

## 2023-04-13 VITALS
OXYGEN SATURATION: 98 % | DIASTOLIC BLOOD PRESSURE: 80 MMHG | WEIGHT: 225 LBS | HEART RATE: 93 BPM | RESPIRATION RATE: 14 BRPM | BODY MASS INDEX: 38.41 KG/M2 | SYSTOLIC BLOOD PRESSURE: 126 MMHG | HEIGHT: 64 IN

## 2023-04-13 PROCEDURE — 95117 IMMUNOTHERAPY INJECTIONS: CPT

## 2023-04-13 NOTE — PROGRESS NOTE ADULT - ASSESSMENT
IMPRESSION:   Cardiac arrest   Anaphylaxis secondary to Ibuprofen  HO Asthma      PLAN:    CNS: No sedatives     HEENT: oral care.     PULMONARY: HOB at 45 degrees; Nebs q4h. c/w Solumedrol q 12. Symbicort      CARDIOVASCULAR: Keep I=O.     GI: GI prophylaxis.  Feeding as tolerated. D/c pepcid for now      RENAL: F/u lytes and replete PRN    INFECTIOUS DISEASE: FU cultures; If negative d/c unasyn     HEMATOLOGICAL:  DVT prophylaxis.    ENDOCRINE:  Follow up FS.  Insulin protocol if needed.    MUSCULOSKELETAL: Frequent positioning     OOB   Possible downgrade in afternoon Consent 1/Introductory Paragraph: The rationale for Mohs was explained to the patient and consent was obtained. The risks, benefits and alternatives to therapy were discussed in detail. Specifically, the risks of infection, scarring, bleeding, prolonged wound healing, incomplete removal, allergy to anesthesia, nerve injury and recurrence were addressed. Prior to the procedure, the treatment site was clearly identified and confirmed by the patient. All components of Universal Protocol/PAUSE Rule completed.

## 2023-04-27 ENCOUNTER — APPOINTMENT (OUTPATIENT)
Dept: PULMONOLOGY | Facility: CLINIC | Age: 63
End: 2023-04-27
Payer: COMMERCIAL

## 2023-04-27 VITALS
HEART RATE: 104 BPM | WEIGHT: 225 LBS | HEIGHT: 64 IN | RESPIRATION RATE: 12 BRPM | SYSTOLIC BLOOD PRESSURE: 130 MMHG | DIASTOLIC BLOOD PRESSURE: 80 MMHG | OXYGEN SATURATION: 98 % | BODY MASS INDEX: 38.41 KG/M2

## 2023-04-27 PROCEDURE — 95117 IMMUNOTHERAPY INJECTIONS: CPT

## 2023-05-05 RX ORDER — ALBUTEROL SULFATE 2.5 MG/3ML
(2.5 MG/3ML) SOLUTION RESPIRATORY (INHALATION)
Qty: 4 | Refills: 3 | Status: ACTIVE | COMMUNITY
Start: 2021-11-24 | End: 1900-01-01

## 2023-05-08 ENCOUNTER — RX RENEWAL (OUTPATIENT)
Age: 63
End: 2023-05-08

## 2023-05-11 ENCOUNTER — APPOINTMENT (OUTPATIENT)
Dept: PULMONOLOGY | Facility: CLINIC | Age: 63
End: 2023-05-11
Payer: COMMERCIAL

## 2023-05-11 VITALS
HEART RATE: 101 BPM | DIASTOLIC BLOOD PRESSURE: 70 MMHG | WEIGHT: 225 LBS | HEIGHT: 64 IN | SYSTOLIC BLOOD PRESSURE: 122 MMHG | OXYGEN SATURATION: 96 % | BODY MASS INDEX: 38.41 KG/M2

## 2023-05-11 PROCEDURE — 95117 IMMUNOTHERAPY INJECTIONS: CPT

## 2023-05-11 RX ORDER — FLUTICASONE FUROATE AND VILANTEROL TRIFENATATE 200; 25 UG/1; UG/1
200-25 POWDER RESPIRATORY (INHALATION)
Qty: 1 | Refills: 5 | Status: ACTIVE | COMMUNITY
Start: 2022-04-20 | End: 1900-01-01

## 2023-05-11 RX ORDER — ALBUTEROL SULFATE 90 UG/1
108 (90 BASE) INHALANT RESPIRATORY (INHALATION)
Qty: 1 | Refills: 3 | Status: ACTIVE | COMMUNITY
Start: 2021-09-09 | End: 1900-01-01

## 2023-05-22 ENCOUNTER — APPOINTMENT (OUTPATIENT)
Dept: PULMONOLOGY | Facility: CLINIC | Age: 63
End: 2023-05-22

## 2023-05-31 ENCOUNTER — RX RENEWAL (OUTPATIENT)
Age: 63
End: 2023-05-31

## 2023-06-01 ENCOUNTER — APPOINTMENT (OUTPATIENT)
Dept: PULMONOLOGY | Facility: CLINIC | Age: 63
End: 2023-06-01
Payer: COMMERCIAL

## 2023-06-01 VITALS
DIASTOLIC BLOOD PRESSURE: 80 MMHG | SYSTOLIC BLOOD PRESSURE: 124 MMHG | BODY MASS INDEX: 38.41 KG/M2 | OXYGEN SATURATION: 97 % | HEIGHT: 64 IN | HEART RATE: 80 BPM | WEIGHT: 225 LBS

## 2023-06-01 PROCEDURE — 95117 IMMUNOTHERAPY INJECTIONS: CPT

## 2023-06-21 ENCOUNTER — APPOINTMENT (OUTPATIENT)
Dept: PULMONOLOGY | Facility: CLINIC | Age: 63
End: 2023-06-21
Payer: COMMERCIAL

## 2023-06-21 VITALS
DIASTOLIC BLOOD PRESSURE: 70 MMHG | WEIGHT: 225 LBS | RESPIRATION RATE: 14 BRPM | HEART RATE: 77 BPM | HEIGHT: 64 IN | BODY MASS INDEX: 38.41 KG/M2 | OXYGEN SATURATION: 98 % | SYSTOLIC BLOOD PRESSURE: 120 MMHG

## 2023-06-21 PROCEDURE — 95117 IMMUNOTHERAPY INJECTIONS: CPT

## 2023-07-18 ENCOUNTER — APPOINTMENT (OUTPATIENT)
Dept: OTOLARYNGOLOGY | Facility: AMBULATORY SURGERY CENTER | Age: 63
End: 2023-07-18

## 2023-07-26 ENCOUNTER — APPOINTMENT (OUTPATIENT)
Dept: PULMONOLOGY | Facility: CLINIC | Age: 63
End: 2023-07-26
Payer: COMMERCIAL

## 2023-07-26 VITALS
WEIGHT: 225 LBS | DIASTOLIC BLOOD PRESSURE: 72 MMHG | OXYGEN SATURATION: 97 % | RESPIRATION RATE: 14 BRPM | HEIGHT: 64 IN | HEART RATE: 100 BPM | SYSTOLIC BLOOD PRESSURE: 134 MMHG | BODY MASS INDEX: 38.41 KG/M2

## 2023-07-26 PROCEDURE — 95117 IMMUNOTHERAPY INJECTIONS: CPT

## 2023-08-09 ENCOUNTER — APPOINTMENT (OUTPATIENT)
Dept: PULMONOLOGY | Facility: CLINIC | Age: 63
End: 2023-08-09

## 2023-08-21 ENCOUNTER — RX RENEWAL (OUTPATIENT)
Age: 63
End: 2023-08-21

## 2023-11-20 ENCOUNTER — RX RENEWAL (OUTPATIENT)
Age: 63
End: 2023-11-20

## 2023-11-20 RX ORDER — MONTELUKAST 10 MG/1
10 TABLET, FILM COATED ORAL
Qty: 90 | Refills: 0 | Status: ACTIVE | COMMUNITY
Start: 2022-11-07 | End: 1900-01-01

## 2023-12-01 ENCOUNTER — APPOINTMENT (OUTPATIENT)
Dept: PULMONOLOGY | Facility: CLINIC | Age: 63
End: 2023-12-01
Payer: COMMERCIAL

## 2023-12-01 VITALS
BODY MASS INDEX: 38.41 KG/M2 | HEIGHT: 64 IN | SYSTOLIC BLOOD PRESSURE: 140 MMHG | OXYGEN SATURATION: 96 % | HEART RATE: 104 BPM | WEIGHT: 225 LBS | DIASTOLIC BLOOD PRESSURE: 70 MMHG | RESPIRATION RATE: 12 BRPM

## 2023-12-01 PROCEDURE — 99213 OFFICE O/P EST LOW 20 MIN: CPT | Mod: 25

## 2023-12-01 PROCEDURE — 95117 IMMUNOTHERAPY INJECTIONS: CPT

## 2023-12-01 RX ORDER — TIOTROPIUM BROMIDE INHALATION SPRAY 1.56 UG/1
1.25 SPRAY, METERED RESPIRATORY (INHALATION) DAILY
Qty: 1 | Refills: 5 | Status: ACTIVE | COMMUNITY
Start: 2023-12-01 | End: 1900-01-01

## 2023-12-01 RX ORDER — AMOXICILLIN AND CLAVULANATE POTASSIUM 875; 125 MG/1; MG/1
875-125 TABLET, COATED ORAL
Qty: 10 | Refills: 0 | Status: ACTIVE | COMMUNITY
Start: 2023-12-01 | End: 1900-01-01

## 2023-12-01 RX ORDER — EPINEPHRINE 0.3 MG/.3ML
0.3 INJECTION INTRAMUSCULAR
Qty: 1 | Refills: 3 | Status: ACTIVE | COMMUNITY
Start: 2023-12-01 | End: 1900-01-01

## 2024-01-08 ENCOUNTER — APPOINTMENT (OUTPATIENT)
Dept: PULMONOLOGY | Facility: CLINIC | Age: 64
End: 2024-01-08
Payer: COMMERCIAL

## 2024-01-08 VITALS
DIASTOLIC BLOOD PRESSURE: 71 MMHG | SYSTOLIC BLOOD PRESSURE: 119 MMHG | BODY MASS INDEX: 40.12 KG/M2 | HEIGHT: 64 IN | OXYGEN SATURATION: 97 % | HEART RATE: 110 BPM | WEIGHT: 235 LBS

## 2024-01-08 DIAGNOSIS — G47.33 OBSTRUCTIVE SLEEP APNEA (ADULT) (PEDIATRIC): ICD-10-CM

## 2024-01-08 DIAGNOSIS — J45.50 SEVERE PERSISTENT ASTHMA, UNCOMPLICATED: ICD-10-CM

## 2024-01-08 DIAGNOSIS — J30.9 ALLERGIC RHINITIS, UNSPECIFIED: ICD-10-CM

## 2024-01-08 DIAGNOSIS — J45.909 UNSPECIFIED ASTHMA, UNCOMPLICATED: ICD-10-CM

## 2024-01-08 PROCEDURE — 95117 IMMUNOTHERAPY INJECTIONS: CPT

## 2024-01-08 PROCEDURE — 99214 OFFICE O/P EST MOD 30 MIN: CPT | Mod: 25

## 2024-01-08 RX ORDER — MONTELUKAST 10 MG/1
10 TABLET, FILM COATED ORAL
Qty: 1 | Refills: 3 | Status: ACTIVE | COMMUNITY
Start: 2024-01-08 | End: 1900-01-01

## 2024-01-24 ENCOUNTER — APPOINTMENT (OUTPATIENT)
Dept: PULMONOLOGY | Facility: CLINIC | Age: 64
End: 2024-01-24

## 2024-06-18 RX ORDER — OMALIZUMAB 150 MG/ML
150 INJECTION, SOLUTION SUBCUTANEOUS
Qty: 4 | Refills: 5 | Status: ACTIVE | COMMUNITY
Start: 2021-09-23 | End: 1900-01-01

## 2025-01-15 ENCOUNTER — RX RENEWAL (OUTPATIENT)
Age: 65
End: 2025-01-15

## 2025-06-19 ENCOUNTER — APPOINTMENT (OUTPATIENT)
Dept: PULMONOLOGY | Facility: CLINIC | Age: 65
End: 2025-06-19